# Patient Record
Sex: MALE | Race: OTHER | Employment: FULL TIME | URBAN - METROPOLITAN AREA
[De-identification: names, ages, dates, MRNs, and addresses within clinical notes are randomized per-mention and may not be internally consistent; named-entity substitution may affect disease eponyms.]

---

## 2022-08-24 ENCOUNTER — APPOINTMENT (OUTPATIENT)
Dept: VASCULAR SURGERY | Age: 42
DRG: 134 | End: 2022-08-24
Attending: PHYSICIAN ASSISTANT
Payer: MEDICAID

## 2022-08-24 ENCOUNTER — HOSPITAL ENCOUNTER (INPATIENT)
Age: 42
LOS: 1 days | Discharge: HOME | DRG: 134 | End: 2022-08-25
Attending: STUDENT IN AN ORGANIZED HEALTH CARE EDUCATION/TRAINING PROGRAM | Admitting: INTERNAL MEDICINE
Payer: MEDICAID

## 2022-08-24 ENCOUNTER — APPOINTMENT (OUTPATIENT)
Dept: GENERAL RADIOLOGY | Age: 42
DRG: 134 | End: 2022-08-24
Attending: STUDENT IN AN ORGANIZED HEALTH CARE EDUCATION/TRAINING PROGRAM
Payer: MEDICAID

## 2022-08-24 ENCOUNTER — APPOINTMENT (OUTPATIENT)
Dept: CT IMAGING | Age: 42
DRG: 134 | End: 2022-08-24
Attending: PHYSICIAN ASSISTANT
Payer: MEDICAID

## 2022-08-24 DIAGNOSIS — I26.99 BILATERAL PULMONARY EMBOLISM (HCC): Primary | ICD-10-CM

## 2022-08-24 DIAGNOSIS — I82.412 ACUTE DEEP VEIN THROMBOSIS (DVT) OF FEMORAL VEIN OF LEFT LOWER EXTREMITY (HCC): ICD-10-CM

## 2022-08-24 DIAGNOSIS — E27.8 ADRENAL MASS (HCC): ICD-10-CM

## 2022-08-24 LAB
ALBUMIN SERPL-MCNC: 4.1 G/DL (ref 3.5–5)
ALBUMIN/GLOB SERPL: 0.9 {RATIO} (ref 1.1–2.2)
ALP SERPL-CCNC: 99 U/L (ref 45–117)
ALT SERPL-CCNC: 54 U/L (ref 12–78)
ANION GAP SERPL CALC-SCNC: 8 MMOL/L (ref 5–15)
AST SERPL-CCNC: 34 U/L (ref 15–37)
ATRIAL RATE: 104 BPM
BASOPHILS # BLD: 0 K/UL (ref 0–0.1)
BASOPHILS # BLD: 0.1 K/UL (ref 0–0.1)
BASOPHILS NFR BLD: 1 % (ref 0–1)
BASOPHILS NFR BLD: 1 % (ref 0–1)
BILIRUB SERPL-MCNC: 0.9 MG/DL (ref 0.2–1)
BNP SERPL-MCNC: 243 PG/ML
BUN SERPL-MCNC: 14 MG/DL (ref 6–20)
BUN/CREAT SERPL: 11 (ref 12–20)
CALCIUM SERPL-MCNC: 9.5 MG/DL (ref 8.5–10.1)
CALCULATED P AXIS, ECG09: 42 DEGREES
CALCULATED R AXIS, ECG10: 1 DEGREES
CALCULATED T AXIS, ECG11: 30 DEGREES
CHLORIDE SERPL-SCNC: 101 MMOL/L (ref 97–108)
CO2 SERPL-SCNC: 28 MMOL/L (ref 21–32)
COMMENT, HOLDF: NORMAL
CREAT SERPL-MCNC: 1.24 MG/DL (ref 0.7–1.3)
DIAGNOSIS, 93000: NORMAL
DIFFERENTIAL METHOD BLD: ABNORMAL
DIFFERENTIAL METHOD BLD: ABNORMAL
EOSINOPHIL # BLD: 0.2 K/UL (ref 0–0.4)
EOSINOPHIL # BLD: 0.2 K/UL (ref 0–0.4)
EOSINOPHIL NFR BLD: 3 % (ref 0–7)
EOSINOPHIL NFR BLD: 3 % (ref 0–7)
ERYTHROCYTE [DISTWIDTH] IN BLOOD BY AUTOMATED COUNT: 11.7 % (ref 11.5–14.5)
ERYTHROCYTE [DISTWIDTH] IN BLOOD BY AUTOMATED COUNT: 11.7 % (ref 11.5–14.5)
GLOBULIN SER CALC-MCNC: 4.6 G/DL (ref 2–4)
GLUCOSE SERPL-MCNC: 106 MG/DL (ref 65–100)
HCT VFR BLD AUTO: 48.4 % (ref 36.6–50.3)
HCT VFR BLD AUTO: 52.7 % (ref 36.6–50.3)
HGB BLD-MCNC: 17.4 G/DL (ref 12.1–17)
HGB BLD-MCNC: 18.5 G/DL (ref 12.1–17)
IMM GRANULOCYTES # BLD AUTO: 0 K/UL (ref 0–0.04)
IMM GRANULOCYTES # BLD AUTO: 0.1 K/UL (ref 0–0.04)
IMM GRANULOCYTES NFR BLD AUTO: 1 % (ref 0–0.5)
IMM GRANULOCYTES NFR BLD AUTO: 1 % (ref 0–0.5)
INR PPP: 1 (ref 0.9–1.1)
LYMPHOCYTES # BLD: 1.3 K/UL (ref 0.8–3.5)
LYMPHOCYTES # BLD: 1.6 K/UL (ref 0.8–3.5)
LYMPHOCYTES NFR BLD: 18 % (ref 12–49)
LYMPHOCYTES NFR BLD: 22 % (ref 12–49)
MCH RBC QN AUTO: 33.5 PG (ref 26–34)
MCH RBC QN AUTO: 33.9 PG (ref 26–34)
MCHC RBC AUTO-ENTMCNC: 35.1 G/DL (ref 30–36.5)
MCHC RBC AUTO-ENTMCNC: 36 G/DL (ref 30–36.5)
MCV RBC AUTO: 94.3 FL (ref 80–99)
MCV RBC AUTO: 95.5 FL (ref 80–99)
MONOCYTES # BLD: 0.8 K/UL (ref 0–1)
MONOCYTES # BLD: 1 K/UL (ref 0–1)
MONOCYTES NFR BLD: 11 % (ref 5–13)
MONOCYTES NFR BLD: 13 % (ref 5–13)
NEUTS SEG # BLD: 4.6 K/UL (ref 1.8–8)
NEUTS SEG # BLD: 4.8 K/UL (ref 1.8–8)
NEUTS SEG NFR BLD: 62 % (ref 32–75)
NEUTS SEG NFR BLD: 64 % (ref 32–75)
NRBC # BLD: 0 K/UL (ref 0–0.01)
NRBC # BLD: 0 K/UL (ref 0–0.01)
NRBC BLD-RTO: 0 PER 100 WBC
NRBC BLD-RTO: 0 PER 100 WBC
P-R INTERVAL, ECG05: 118 MS
PLATELET # BLD AUTO: 203 K/UL (ref 150–400)
PLATELET # BLD AUTO: 207 K/UL (ref 150–400)
PMV BLD AUTO: 10.1 FL (ref 8.9–12.9)
PMV BLD AUTO: 9.9 FL (ref 8.9–12.9)
POTASSIUM SERPL-SCNC: 4.1 MMOL/L (ref 3.5–5.1)
PROT SERPL-MCNC: 8.7 G/DL (ref 6.4–8.2)
PROTHROMBIN TIME: 10.4 SEC (ref 9–11.1)
Q-T INTERVAL, ECG07: 350 MS
QRS DURATION, ECG06: 82 MS
QTC CALCULATION (BEZET), ECG08: 460 MS
RBC # BLD AUTO: 5.13 M/UL (ref 4.1–5.7)
RBC # BLD AUTO: 5.52 M/UL (ref 4.1–5.7)
SAMPLES BEING HELD,HOLD: NORMAL
SODIUM SERPL-SCNC: 137 MMOL/L (ref 136–145)
TROPONIN-HIGH SENSITIVITY: 18 NG/L (ref 0–76)
VENTRICULAR RATE, ECG03: 104 BPM
WBC # BLD AUTO: 7.4 K/UL (ref 4.1–11.1)
WBC # BLD AUTO: 7.4 K/UL (ref 4.1–11.1)

## 2022-08-24 PROCEDURE — 94761 N-INVAS EAR/PLS OXIMETRY MLT: CPT

## 2022-08-24 PROCEDURE — 80053 COMPREHEN METABOLIC PANEL: CPT

## 2022-08-24 PROCEDURE — 83880 ASSAY OF NATRIURETIC PEPTIDE: CPT

## 2022-08-24 PROCEDURE — 96360 HYDRATION IV INFUSION INIT: CPT

## 2022-08-24 PROCEDURE — 65270000046 HC RM TELEMETRY

## 2022-08-24 PROCEDURE — 74011000636 HC RX REV CODE- 636: Performed by: RADIOLOGY

## 2022-08-24 PROCEDURE — 74011250636 HC RX REV CODE- 250/636: Performed by: PHYSICIAN ASSISTANT

## 2022-08-24 PROCEDURE — 93971 EXTREMITY STUDY: CPT

## 2022-08-24 PROCEDURE — 84484 ASSAY OF TROPONIN QUANT: CPT

## 2022-08-24 PROCEDURE — 71046 X-RAY EXAM CHEST 2 VIEWS: CPT

## 2022-08-24 PROCEDURE — 93005 ELECTROCARDIOGRAM TRACING: CPT

## 2022-08-24 PROCEDURE — 71275 CT ANGIOGRAPHY CHEST: CPT

## 2022-08-24 PROCEDURE — 85025 COMPLETE CBC W/AUTO DIFF WBC: CPT

## 2022-08-24 PROCEDURE — 99285 EMERGENCY DEPT VISIT HI MDM: CPT

## 2022-08-24 PROCEDURE — 85610 PROTHROMBIN TIME: CPT

## 2022-08-24 RX ORDER — MORPHINE SULFATE 2 MG/ML
2 INJECTION, SOLUTION INTRAMUSCULAR; INTRAVENOUS
Status: DISCONTINUED | OUTPATIENT
Start: 2022-08-24 | End: 2022-08-25 | Stop reason: HOSPADM

## 2022-08-24 RX ORDER — ONDANSETRON 4 MG/1
4 TABLET, ORALLY DISINTEGRATING ORAL
Status: DISCONTINUED | OUTPATIENT
Start: 2022-08-24 | End: 2022-08-25 | Stop reason: HOSPADM

## 2022-08-24 RX ORDER — OXYCODONE AND ACETAMINOPHEN 5; 325 MG/1; MG/1
1 TABLET ORAL
Status: DISCONTINUED | OUTPATIENT
Start: 2022-08-24 | End: 2022-08-25 | Stop reason: HOSPADM

## 2022-08-24 RX ORDER — SODIUM CHLORIDE, SODIUM LACTATE, POTASSIUM CHLORIDE, CALCIUM CHLORIDE 600; 310; 30; 20 MG/100ML; MG/100ML; MG/100ML; MG/100ML
75 INJECTION, SOLUTION INTRAVENOUS CONTINUOUS
Status: DISCONTINUED | OUTPATIENT
Start: 2022-08-24 | End: 2022-08-25

## 2022-08-24 RX ORDER — HEPARIN SODIUM 1000 [USP'U]/ML
80 INJECTION, SOLUTION INTRAVENOUS; SUBCUTANEOUS ONCE
Status: COMPLETED | OUTPATIENT
Start: 2022-08-24 | End: 2022-08-24

## 2022-08-24 RX ORDER — HEPARIN SODIUM 10000 [USP'U]/100ML
16-36 INJECTION, SOLUTION INTRAVENOUS
Status: DISCONTINUED | OUTPATIENT
Start: 2022-08-24 | End: 2022-08-25

## 2022-08-24 RX ORDER — ONDANSETRON 2 MG/ML
4 INJECTION INTRAMUSCULAR; INTRAVENOUS
Status: DISCONTINUED | OUTPATIENT
Start: 2022-08-24 | End: 2022-08-25 | Stop reason: HOSPADM

## 2022-08-24 RX ORDER — EMTRICITABINE AND TENOFOVIR DISOPROXIL FUMARATE 200; 300 MG/1; MG/1
1 TABLET, FILM COATED ORAL DAILY
COMMUNITY

## 2022-08-24 RX ORDER — SODIUM CHLORIDE 0.9 % (FLUSH) 0.9 %
5-40 SYRINGE (ML) INJECTION AS NEEDED
Status: DISCONTINUED | OUTPATIENT
Start: 2022-08-24 | End: 2022-08-25 | Stop reason: HOSPADM

## 2022-08-24 RX ORDER — ACETAMINOPHEN 325 MG/1
650 TABLET ORAL
Status: DISCONTINUED | OUTPATIENT
Start: 2022-08-24 | End: 2022-08-24

## 2022-08-24 RX ORDER — POLYETHYLENE GLYCOL 3350 17 G/17G
17 POWDER, FOR SOLUTION ORAL DAILY PRN
Status: DISCONTINUED | OUTPATIENT
Start: 2022-08-24 | End: 2022-08-25 | Stop reason: HOSPADM

## 2022-08-24 RX ORDER — EMTRICITABINE AND TENOFOVIR DISOPROXIL FUMARATE 200; 300 MG/1; MG/1
1 TABLET, FILM COATED ORAL DAILY
Status: DISCONTINUED | OUTPATIENT
Start: 2022-08-25 | End: 2022-08-25 | Stop reason: HOSPADM

## 2022-08-24 RX ORDER — ACETAMINOPHEN 650 MG/1
650 SUPPOSITORY RECTAL
Status: DISCONTINUED | OUTPATIENT
Start: 2022-08-24 | End: 2022-08-25 | Stop reason: HOSPADM

## 2022-08-24 RX ORDER — SODIUM CHLORIDE 0.9 % (FLUSH) 0.9 %
5-40 SYRINGE (ML) INJECTION EVERY 8 HOURS
Status: DISCONTINUED | OUTPATIENT
Start: 2022-08-24 | End: 2022-08-25 | Stop reason: HOSPADM

## 2022-08-24 RX ORDER — ACETAMINOPHEN 325 MG/1
650 TABLET ORAL
Status: DISCONTINUED | OUTPATIENT
Start: 2022-08-24 | End: 2022-08-25 | Stop reason: HOSPADM

## 2022-08-24 RX ADMIN — IOPAMIDOL 100 ML: 755 INJECTION, SOLUTION INTRAVENOUS at 18:39

## 2022-08-24 RX ADMIN — HEPARIN SODIUM 7180 UNITS: 1000 INJECTION INTRAVENOUS; SUBCUTANEOUS at 21:23

## 2022-08-24 RX ADMIN — HEPARIN SODIUM AND DEXTROSE 16 UNITS/KG/HR: 10000; 5 INJECTION INTRAVENOUS at 21:23

## 2022-08-24 RX ADMIN — SODIUM CHLORIDE 1000 ML: 900 INJECTION, SOLUTION INTRAVENOUS at 17:21

## 2022-08-24 NOTE — ED NOTES
Bedside and Verbal shift change report given to Loren Richardson RN (oncoming nurse) by Remedios Verduzco RN (offgoing nurse). Report included the following information SBAR, ED Summary, MAR, and Recent Results.

## 2022-08-24 NOTE — ED NOTES
Bedside and Verbal shift change report given to Miriam Boswell RN (oncoming nurse) by Deuce Nicolas RN (offgoing nurse). Report included the following information SBAR.

## 2022-08-24 NOTE — ED TRIAGE NOTES
Pt referred from [facility] for SOB and LEFT lower leg swelling. Pt reports he also noticed some discoloration to his penis. Pt referred to rule out DVT/PE.

## 2022-08-24 NOTE — ED PROVIDER NOTES
44yo male with high risk behavior on pre-exposure prophylaxis (PrEP) on Truvada presenting with L calf pain and shortness of breath x 4 days. 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 No hx of PE, DVT. No CP, cough, URI sx's, fever, chills, leg swelling. He states 4 days ago he began to feel calf pain and felt \"anxious\". Feeling winded with walking up his steps at home prompting the ER visit. History reviewed. No pertinent past medical history. History reviewed. No pertinent surgical history. History reviewed. No pertinent family history. Social History     Socioeconomic History    Marital status: SINGLE     Spouse name: Not on file    Number of children: Not on file    Years of education: Not on file    Highest education level: Not on file   Occupational History    Not on file   Tobacco Use    Smoking status: Not on file    Smokeless tobacco: Not on file   Substance and Sexual Activity    Alcohol use: Not on file    Drug use: Not on file    Sexual activity: Not on file   Other Topics Concern    Not on file   Social History Narrative    Not on file     Social Determinants of Health     Financial Resource Strain: Not on file   Food Insecurity: Not on file   Transportation Needs: Not on file   Physical Activity: Not on file   Stress: Not on file   Social Connections: Not on file   Intimate Partner Violence: Not on file   Housing Stability: Not on file         ALLERGIES: Pcn [penicillins]    Review of Systems   Constitutional: Negative. Negative for activity change, chills, fatigue and unexpected weight change. HENT:  Negative for trouble swallowing. Respiratory:  Positive for shortness of breath. Negative for cough, chest tightness and wheezing. Cardiovascular: Negative. Negative for chest pain and palpitations. Gastrointestinal: Negative. Negative for abdominal pain, diarrhea, nausea and vomiting. Genitourinary: Negative.   Negative for dysuria, flank pain, frequency and hematuria. Musculoskeletal: Negative. Negative for arthralgias, back pain, neck pain and neck stiffness. L calf pain   Skin: Negative. Negative for color change and rash. Neurological: Negative. Negative for dizziness, numbness and headaches. All other systems reviewed and are negative. Vitals:    08/24/22 1553   BP: (!) 144/96   Pulse: (!) 113   Resp: 18   Temp: 97.8 °F (36.6 °C)   SpO2: 98%            Physical Exam  Vitals and nursing note reviewed. Constitutional:       General: He is not in acute distress. Appearance: Normal appearance. He is well-developed. He is not toxic-appearing or diaphoretic. HENT:      Head: Normocephalic and atraumatic. Eyes:      General:         Right eye: No discharge. Left eye: No discharge. Conjunctiva/sclera: Conjunctivae normal.   Neck:      Trachea: No tracheal tenderness. Cardiovascular:      Rate and Rhythm: Regular rhythm. Tachycardia present. Pulses: Normal pulses. Heart sounds: Normal heart sounds. No murmur heard. No friction rub. No gallop. Pulmonary:      Effort: Pulmonary effort is normal. No respiratory distress. Breath sounds: Normal breath sounds. No wheezing or rales. Chest:      Chest wall: No tenderness. Abdominal:      General: Bowel sounds are normal. There is no distension. Palpations: Abdomen is soft. Tenderness: There is no abdominal tenderness. There is no guarding or rebound. Musculoskeletal:         General: No tenderness. Normal range of motion. Cervical back: Full passive range of motion without pain and normal range of motion. Skin:     General: Skin is warm and dry. Capillary Refill: Capillary refill takes less than 2 seconds. Findings: No abrasion, erythema or rash. Neurological:      General: No focal deficit present. Mental Status: He is alert and oriented to person, place, and time. Cranial Nerves: No cranial nerve deficit. Sensory: No sensory deficit. Coordination: Coordination normal.   Psychiatric:         Speech: Speech normal.         Behavior: Behavior normal.        MDM  Number of Diagnoses or Management Options  Acute deep vein thrombosis (DVT) of femoral vein of left lower extremity (HCC)  Adrenal mass (HCC)  Bilateral pulmonary embolism (HCC)  Diagnosis management comments:   Ddx: DVT, PE, heart strain, ACS       Amount and/or Complexity of Data Reviewed  Clinical lab tests: ordered and reviewed  Tests in the radiology section of CPT®: ordered and reviewed  Review and summarize past medical records: yes  Discuss the patient with other providers: yes (Hospitalist, ER attending)  Independent visualization of images, tracings, or specimens: yes (EKG)    Patient Progress  Patient progress: stable    ED Course as of 08/26/22 0105   Wed Aug 24, 2022   1610 ED EKG interpretation:4:10 PM  Rhythm: sinus tachycardia; Rate (approx.): 104; Axis: normal; QRS interval: normal ; ST/T wave: normal [JW]      ED Course User Index  [JW] Kory Zheng MD       Procedures    I discussed patient's PMH, exam findings as well as careplan with the ER attending who agrees with care plan. Cabrera Brown PA-C    Total critical care time (not including time spent performing separately reportable procedures): 40    [facility] for Admission  7:20 PM    ED Room Number: ER25/25  Patient Name and age:  Lu Marquez 35 y.o.  male  Working Diagnosis:   1. Bilateral pulmonary embolism ([address]    2. Acute deep vein thrombosis (DVT) of femoral vein of left lower extremity (HCC)    3. Adrenal mass ([address]        COVID-19 Suspicion:  no  Sepsis present:  no  Reassessment needed: N/A  Code Status:  Full Code  Readmission: no  Isolation Requirements:  no  Recommended Level of Care:  telemetry  Department:[facility] Adult ED - 21   Other:  42yo male with SOB and calf pain found to have extensive bilateral PEs and L distal femoral DVT.  No previous history. Sits in the car long hours. Is on PreP therapy, Truvada. Heparin drip ordered.

## 2022-08-25 ENCOUNTER — APPOINTMENT (OUTPATIENT)
Dept: CT IMAGING | Age: 42
DRG: 134 | End: 2022-08-25
Attending: INTERNAL MEDICINE
Payer: MEDICAID

## 2022-08-25 ENCOUNTER — APPOINTMENT (OUTPATIENT)
Dept: NON INVASIVE DIAGNOSTICS | Age: 42
DRG: 134 | End: 2022-08-25
Attending: INTERNAL MEDICINE
Payer: MEDICAID

## 2022-08-25 VITALS
WEIGHT: 197.97 LBS | OXYGEN SATURATION: 97 % | SYSTOLIC BLOOD PRESSURE: 137 MMHG | DIASTOLIC BLOOD PRESSURE: 83 MMHG | HEIGHT: 70 IN | RESPIRATION RATE: 16 BRPM | HEART RATE: 80 BPM | TEMPERATURE: 99.1 F | BODY MASS INDEX: 28.34 KG/M2

## 2022-08-25 LAB
ALBUMIN SERPL-MCNC: 3.4 G/DL (ref 3.5–5)
ALBUMIN/GLOB SERPL: 0.9 {RATIO} (ref 1.1–2.2)
ALP SERPL-CCNC: 85 U/L (ref 45–117)
ALT SERPL-CCNC: 41 U/L (ref 12–78)
ANION GAP SERPL CALC-SCNC: 7 MMOL/L (ref 5–15)
APTT PPP: 55.5 SEC (ref 22.1–31)
APTT PPP: 66.1 SEC (ref 22.1–31)
AST SERPL-CCNC: 25 U/L (ref 15–37)
BASOPHILS # BLD: 0.1 K/UL (ref 0–0.1)
BASOPHILS NFR BLD: 1 % (ref 0–1)
BILIRUB SERPL-MCNC: 0.9 MG/DL (ref 0.2–1)
BUN SERPL-MCNC: 15 MG/DL (ref 6–20)
BUN/CREAT SERPL: 14 (ref 12–20)
CALCIUM SERPL-MCNC: 8.7 MG/DL (ref 8.5–10.1)
CHLORIDE SERPL-SCNC: 104 MMOL/L (ref 97–108)
CO2 SERPL-SCNC: 27 MMOL/L (ref 21–32)
COMMENT, HOLDF: NORMAL
CREAT SERPL-MCNC: 1.06 MG/DL (ref 0.7–1.3)
DIFFERENTIAL METHOD BLD: ABNORMAL
ECHO AO ROOT DIAM: 3.3 CM
ECHO AO ROOT INDEX: 1.59 CM/M2
ECHO AV AREA PEAK VELOCITY: 3.3 CM2
ECHO AV AREA/BSA PEAK VELOCITY: 1.6 CM2/M2
ECHO AV PEAK GRADIENT: 4 MMHG
ECHO AV PEAK VELOCITY: 1.1 M/S
ECHO AV VELOCITY RATIO: 0.82
ECHO LA DIAMETER INDEX: 1.59 CM/M2
ECHO LA DIAMETER: 3.3 CM
ECHO LA TO AORTIC ROOT RATIO: 1
ECHO LV E' LATERAL VELOCITY: 13 CM/S
ECHO LV E' SEPTAL VELOCITY: 10 CM/S
ECHO LV FRACTIONAL SHORTENING: 30 % (ref 28–44)
ECHO LV INTERNAL DIMENSION DIASTOLE INDEX: 1.3 CM/M2
ECHO LV INTERNAL DIMENSION DIASTOLIC: 2.7 CM (ref 4.2–5.9)
ECHO LV INTERNAL DIMENSION SYSTOLIC INDEX: 0.91 CM/M2
ECHO LV INTERNAL DIMENSION SYSTOLIC: 1.9 CM
ECHO LV IVSD: 1 CM (ref 0.6–1)
ECHO LV MASS 2D: 65.2 G (ref 88–224)
ECHO LV MASS INDEX 2D: 31.3 G/M2 (ref 49–115)
ECHO LV POSTERIOR WALL DIASTOLIC: 0.9 CM (ref 0.6–1)
ECHO LV RELATIVE WALL THICKNESS RATIO: 0.67
ECHO LVOT AREA: 4.2 CM2
ECHO LVOT DIAM: 2.3 CM
ECHO LVOT PEAK GRADIENT: 3 MMHG
ECHO LVOT PEAK VELOCITY: 0.9 M/S
ECHO MV E VELOCITY: 0.63 M/S
ECHO MV E/E' LATERAL: 4.85
ECHO MV E/E' RATIO (AVERAGED): 5.57
ECHO MV E/E' SEPTAL: 6.3
ECHO MV REGURGITANT PEAK GRADIENT: 5 MMHG
ECHO MV REGURGITANT PEAK VELOCITY: 1.1 M/S
ECHO PV MAX VELOCITY: 1 M/S
ECHO PV PEAK GRADIENT: 4 MMHG
ECHO RV TAPSE: 2.2 CM (ref 1.7–?)
EOSINOPHIL # BLD: 0.2 K/UL (ref 0–0.4)
EOSINOPHIL NFR BLD: 2 % (ref 0–7)
ERYTHROCYTE [DISTWIDTH] IN BLOOD BY AUTOMATED COUNT: 11.7 % (ref 11.5–14.5)
GLOBULIN SER CALC-MCNC: 4 G/DL (ref 2–4)
GLUCOSE SERPL-MCNC: 108 MG/DL (ref 65–100)
HCT VFR BLD AUTO: 46.8 % (ref 36.6–50.3)
HGB BLD-MCNC: 16.9 G/DL (ref 12.1–17)
IMM GRANULOCYTES # BLD AUTO: 0.1 K/UL (ref 0–0.04)
IMM GRANULOCYTES NFR BLD AUTO: 1 % (ref 0–0.5)
LYMPHOCYTES # BLD: 1.8 K/UL (ref 0.8–3.5)
LYMPHOCYTES NFR BLD: 26 % (ref 12–49)
MAGNESIUM SERPL-MCNC: 2.7 MG/DL (ref 1.6–2.4)
MCH RBC QN AUTO: 33.7 PG (ref 26–34)
MCHC RBC AUTO-ENTMCNC: 36.1 G/DL (ref 30–36.5)
MCV RBC AUTO: 93.2 FL (ref 80–99)
MONOCYTES # BLD: 0.9 K/UL (ref 0–1)
MONOCYTES NFR BLD: 13 % (ref 5–13)
NEUTS SEG # BLD: 4.1 K/UL (ref 1.8–8)
NEUTS SEG NFR BLD: 57 % (ref 32–75)
NRBC # BLD: 0 K/UL (ref 0–0.01)
NRBC BLD-RTO: 0 PER 100 WBC
PHOSPHATE SERPL-MCNC: 3.1 MG/DL (ref 2.6–4.7)
PLATELET # BLD AUTO: 196 K/UL (ref 150–400)
PMV BLD AUTO: 9.8 FL (ref 8.9–12.9)
POTASSIUM SERPL-SCNC: 3.5 MMOL/L (ref 3.5–5.1)
PROT SERPL-MCNC: 7.4 G/DL (ref 6.4–8.2)
RBC # BLD AUTO: 5.02 M/UL (ref 4.1–5.7)
SAMPLES BEING HELD,HOLD: NORMAL
SODIUM SERPL-SCNC: 138 MMOL/L (ref 136–145)
THERAPEUTIC RANGE,PTTT: ABNORMAL SECS (ref 58–77)
THERAPEUTIC RANGE,PTTT: ABNORMAL SECS (ref 58–77)
TROPONIN-HIGH SENSITIVITY: 11 NG/L (ref 0–76)
TSH SERPL DL<=0.05 MIU/L-ACNC: 1.15 UIU/ML (ref 0.36–3.74)
WBC # BLD AUTO: 7.2 K/UL (ref 4.1–11.1)

## 2022-08-25 PROCEDURE — 74011250637 HC RX REV CODE- 250/637: Performed by: HOSPITALIST

## 2022-08-25 PROCEDURE — 83735 ASSAY OF MAGNESIUM: CPT

## 2022-08-25 PROCEDURE — 74176 CT ABD & PELVIS W/O CONTRAST: CPT

## 2022-08-25 PROCEDURE — 74011000250 HC RX REV CODE- 250: Performed by: INTERNAL MEDICINE

## 2022-08-25 PROCEDURE — 36415 COLL VENOUS BLD VENIPUNCTURE: CPT

## 2022-08-25 PROCEDURE — 85730 THROMBOPLASTIN TIME PARTIAL: CPT

## 2022-08-25 PROCEDURE — 74011250637 HC RX REV CODE- 250/637: Performed by: INTERNAL MEDICINE

## 2022-08-25 PROCEDURE — 84484 ASSAY OF TROPONIN QUANT: CPT

## 2022-08-25 PROCEDURE — 84100 ASSAY OF PHOSPHORUS: CPT

## 2022-08-25 PROCEDURE — 84443 ASSAY THYROID STIM HORMONE: CPT

## 2022-08-25 PROCEDURE — 99221 1ST HOSP IP/OBS SF/LOW 40: CPT | Performed by: INTERNAL MEDICINE

## 2022-08-25 PROCEDURE — 85025 COMPLETE CBC W/AUTO DIFF WBC: CPT

## 2022-08-25 PROCEDURE — 80053 COMPREHEN METABOLIC PANEL: CPT

## 2022-08-25 PROCEDURE — 93306 TTE W/DOPPLER COMPLETE: CPT

## 2022-08-25 PROCEDURE — 93306 TTE W/DOPPLER COMPLETE: CPT | Performed by: SPECIALIST

## 2022-08-25 RX ADMIN — EMTRICITABINE AND TENOFOVIR DISOPROXIL FUMARATE 1 TABLET: 200; 300 TABLET, FILM COATED ORAL at 10:02

## 2022-08-25 RX ADMIN — Medication 10 ML: at 14:00

## 2022-08-25 RX ADMIN — APIXABAN 10 MG: 2.5 TABLET, FILM COATED ORAL at 18:01

## 2022-08-25 NOTE — PROGRESS NOTES
Problem: Falls - Risk of  Goal: *Absence of Falls  Description: Document Alejandro Murrieta Fall Risk and appropriate interventions in the flowsheet.   Outcome: Progressing Towards Goal  Note: Fall Risk Interventions:                 Elimination Interventions: Urinal in reach, Call light in reach

## 2022-08-25 NOTE — PROGRESS NOTES
Bedside and Verbal shift change report given to Ania Dial RN (oncoming nurse) by Je Shin RN (offgoing nurse). Report included the following information SBAR, Kardex, Intake/Output, and MAR.

## 2022-08-25 NOTE — H&P
[address]  HISTORY AND PHYSICAL    Name:  Omaira Coker  MR#:  [id number]  :  1980  ACCOUNT #:  [de-identified]  ADMIT DATE:  2022      The patient was seen, evaluated, and admitted by me on 2022. PRIMARY CARE PHYSICIAN:  Unknown. SOURCE OF INFORMATION:  The patient and review of ED electronic medical records. CHIEF COMPLAINT:  Shortness of breath. HISTORY OF PRESENT ILLNESS:  This is a 66-year-old man with a past medical history significant for hypertension, high-risk behavior on pre-exposure prophylaxis for HIV, Truvada, who presented at the emergency room with shortness of breath. This started about 4 days ago and is getting worse, especially with mild exertion such as walking. No associated fever, rigors, or chills and no associated cough. The patient also complained of left calf pain that started at the same time. The pain is constant, dull ache with no radiation. No known aggravating or relieving factors, about 5/10 in severity. 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 No prior history of pulmonary embolism or DVT. When the patient arrived at the emergency room, CTA of the chest was obtained. The CTA shows acute bilateral pulmonary embolism. Ultrasound of the left lower extremity was also positive for DVT. The patient was started on continuous infusion of heparin and was referred to the hospitalist service for admission. No record of prior admission to this hospital.    PAST MEDICAL HISTORY:  Hypertension for which the patient is not taking any medication, high-risk behavior on pre-exposure prophylaxis Truvada for HIV. ALLERGIES:  THE PATIENT IS ALLERGIC TO PENICILLIN. MEDICATIONS:  Truvada 1 tablet daily. FAMILY HISTORY:  This was reviewed. His mother had dyslipidemia. PAST SURGICAL HISTORY:  Not significant.     SOCIAL HISTORY:  No history of alcohol or tobacco abuse. REVIEW OF SYSTEMS:  HEAD, EYES, EARS, NOSE, AND THROAT:  No headache, no dizziness, no blurring of vision, no photophobia. RESPIRATORY SYSTEM:  This is positive for shortness of breath. No cough, no hemoptysis. CARDIOVASCULAR SYSTEM:  No chest pain, no orthopnea, no palpitation. GASTROINTESTINAL SYSTEM:  No nausea or vomiting. No diarrhea. No constipation. GENITOURINARY SYSTEM:  No dysuria, no urgency, and no frequency. All other systems are reviewed and they are negative. PHYSICAL EXAMINATION:  GENERAL APPEARANCE:  The patient appeared ill, in moderate distress. VITAL SIGNS:  On arrival at the emergency room, temperature 97.8, pulse 113, respiratory rate 18, blood pressure 144/96, oxygen saturation 98%. HEENT:  Head:  Normocephalic, atraumatic. Eyes:  Normal eye movement. No redness, no drainage, no discharge. Ears:  Normal external ears with no evidence of drainage. Nose:  No deformity and no drainage. Mouth and Throat:  No visible oral lesion. NECK:  Neck is supple. No JVD, no thyromegaly. CHEST:  Clear breath sounds. No wheezing, no crackles. HEART:  Normal S1 and S2, regular. No clinically appreciable murmur. ABDOMEN:  Soft, nontender. Normal bowel sounds. CNS:  Alert, oriented x3. No gross focal neurological deficit. EXTREMITIES:  No edema. Pulses 2+ bilaterally. MUSCULOSKELETAL SYSTEM:  Mild calf tenderness. SKIN:  No active skin lesions seen in the exposed part of the body. PSYCHIATRY:  Normal mood and affect. LYMPHATIC SYSTEM:  No cervical lymphadenopathy. DIAGNOSTIC DATA:  EKG shows sinus tachycardia. No significant ST and T-waves abnormalities. Chest x-ray, no acute process. Ultrasound of the left lower extremity, positive for DVT.   CTA of the chest shows extensive pulmonary embolus bilaterally involving all lobes, left adrenal mass which may represent an adenoma or cyst.    LABORATORY DATA:  Hematology:  WBC 7.4, hemoglobin 18.5, hematocrit 52.7, platelets 739. Chemistry:  Sodium 137, potassium 4.1, chloride 101, CO2 28, glucose 106, BUN 14, creatinine 1.24, calcium 9.5, total bilirubin 0.9, ALT 54, AST 34, alkaline phosphatase 99, total protein 8.7, albumin level 4.1, globulin at 4.5. Pro-BNP level 243. Troponin high-sensitivity 18. ASSESSMENT:  1. Acute bilateral pulmonary embolism. 2.  Acute deep vein thrombosis, left lower extremity. 3.  Adrenal mass. 4.  Elevated BNP level. 5.  Erythrocytosis. 6.  Hypertension. 7.  High-risk behavior. PLAN:  1. Acute bilateral pulmonary embolism. We will admit the patient for further evaluation and treatment. We will continue with heparin infusion started in the emergency room. The patient will be a candidate for any of the NOAC at the time of discharge home. The pulmonary embolism is most likely coming from the DVT of the lower extremity. We will obtain CT scan of the abdomen and pelvis to evaluate the patient for mass lesion. We will check serial cardiac markers. Since this is quite extensive, Hematology consult will be requested to assist in further evaluation and treatment. 2.  Acute DVT, left lower extremity. We will continue with heparin infusion as stated above. This is the most likely source of the patient's acute bilateral pulmonary embolism. 3.  Adrenal mass. This is most likely incidental finding, but we will obtain a CT scan of the abdomen and pelvis for further evaluation. The patient may require further evaluation depending on the result of the CT scan of the abdomen and pelvis. 4.  Elevated BNP level. The chest x-ray did not show vascular congestion. We will await the results of echocardiogram to determine whether the elevated BNP level is cardiac in origin. 5.  Erythrocytosis. This is most likely due to the volume depletion. We will carry out fluid therapy and monitor the patient's lab. We will check C-reactive protein level. We will also check urine drug screen.   6. Hypertension. The patient is not on any medication for this. We will continue to monitor. If average blood pressure reading remains elevated, the patient will require antihypertensive medication at the time of discharge home. We will check a TSH level. 7.  High-risk behavior. We will continue with pre-exposure prophylaxis Truvada for HIV infection. 8.  Other Issues:  Code Status: The patient is a full code. The patient is on heparin infusion, because of that, there is no need for DVT prophylaxis. FUNCTIONAL STATUS PRIOR TO ADMISSION:  The patient came from home. The patient is ambulatory with no assistive device. COVID PRECAUTION:  The patient was wearing a face mask. I was wearing a face mask and gloves for this patient's encounter.       Laura Juarez MD      [id number]  D:  08/25/2022 3:53  T:  08/25/2022 5:22  JOB #:  [id number]

## 2022-08-25 NOTE — PROGRESS NOTES
[address] Adult  Hospitalist Group                                                                                          Hospitalist Progress Note  Natalia Crespo MD  Answering service: [phone] from in house phone        Date of Service:  2022  NAME:  Antonio Barahona  :  1980  MRN:  [id number]      Admission Summary: This is a 42-year-old man with a past medical history significant for hypertension, high-risk behavior on pre-exposure prophylaxis for HIV, Truvada, who presented at the emergency room with shortness of breath. This started about 4 days ago and is getting worse, especially with mild exertion such as walking. No associated fever, rigors, or chills and no associated cough. The patient also complained of left calf pain that started at the same time. The pain is constant, dull ache with no radiation. No known aggravating or relieving factors, about 5/10 in severity. 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 No prior history of pulmonary embolism or DVT. When the patient arrived at the emergency room, CTA of the chest was obtained. The CTA shows acute bilateral pulmonary embolism. Ultrasound of the left lower extremity was also positive for DVT. The patient was started on continuous infusion of heparin and was referred to the hospitalist service for admission. No record of prior admission to this hospital.       Interval history / Subjective:    Follow Acute DVT/PE   Wants to go home today  Assessment & Plan:     Acute PE/DVT  -no saddle embolus  -on heparin gtt  -Echo   -Appreciate Hematology, switch to NOAC at discharge    Adrenal mass: follow CT abd    Regular diet    Wants to go home today but promises to see a PMD to get the results of echo and CT abd.he understands the risks       Code status: FULL CODE  Prophylaxis: heparin gtt    Plan: heparin gtt, if echo unremarkable will discharge the patient  Care Plan discussed with: Patient  Anticipated Disposition: home     Hospital Problems  Date Reviewed: 8/24/2022            Codes Class Noted POA    * (Principal) Bilateral pulmonary embolism ([facility] [address] ICD-10-CM: I26.99  ICD-9-CM: 415.19  8/24/2022 Yes             Review of Systems:   A comprehensive review of systems was negative except for that written in the HPI. Vital Signs:    Last 24hrs VS reviewed since prior progress note. Most recent are:  Visit Vitals  /81   Pulse (!) 103   Temp 98.9 °F (37.2 °C)   Resp 18   Ht 5' 10\" (1.778 m)   Wt 89.8 kg (197 lb 15.6 oz)   SpO2 96%   BMI 28.41 kg/m²         Intake/Output Summary (Last 24 hours) at 8/25/2022 1443  Last data filed at 8/24/2022 1821  Gross per 24 hour   Intake 1000 ml   Output --   Net 1000 ml        Physical Examination:     I had a face to face encounter with this patient and independently examined them on 8/25/2022 as outlined below:          Constitutional:  No acute distress, cooperative, pleasant    ENT:  Oral mucosa moist, oropharynx benign. Resp:  CTA bilaterally. No wheezing/rhonchi/rales. No accessory muscle use. CV:  Regular rhythm, normal rate, no murmurs, gallops, rubs    GI:  Soft, non distended, non tender. normoactive bowel sounds, no hepatosplenomegaly     Musculoskeletal:  No edema, warm, 2+ pulses throughout    Neurologic:  Moves all extremities.   AAOx3, CN II-XII reviewed            Data Review:    Review and/or order of clinical lab test      Labs:     Recent Labs     08/25/22  0315 08/24/22  1930   WBC 7.2 7.4   HGB 16.9 17.4*   HCT 46.8 48.4    207     Recent Labs     08/25/22  0315 08/24/22  1607    137   K 3.5 4.1    101   CO2 27 28   BUN 15 14   CREA 1.06 1.24   * 106*   CA 8.7 9.5   MG 2.7*  --    PHOS 3.1  --      Recent Labs     08/25/22  0315 08/24/22  1607   ALT 41 54   AP 85 99   TBILI 0.9 0.9   TP 7.4 8.7*   ALB 3.4* 4.1   GLOB 4.0 4.6*     Recent Labs 08/25/22  1354 08/25/22  0617 08/24/22  1930   INR  --   --  1.0   PTP  --   --  10.4   APTT 55.5* 66.1*  --       No results for input(s): FE, TIBC, PSAT, FERR in the last 72 hours. No results found for: FOL, RBCF   No results for input(s): PH, PCO2, PO2 in the last 72 hours. No results for input(s): CPK, CKNDX, TROIQ in the last 72 hours.     No lab exists for component: CPKMB  No results found for: CHOL, CHOLX, CHLST, CHOLV, HDL, HDLP, LDL, LDLC, DLDLP, TGLX, TRIGL, TRIGP, CHHD, CHHDX  No results found for: GLUCPOC  No results found for: COLOR, APPRN, SPGRU, REFSG, JAYSON, PROTU, GLUCU, KETU, BILU, UROU, TERESITA, LEUKU, GLUKE, EPSU, BACTU, WBCU, RBCU, CASTS, UCRY      Medications Reviewed:     Current Facility-Administered Medications   Medication Dose Route Frequency    heparin 25,000 units in D5W 250 ml infusion  16-36 Units/kg/hr IntraVENous TITRATE    morphine injection 2 mg  2 mg IntraVENous Q4H PRN    oxyCODONE-acetaminophen (PERCOCET) 5-325 mg per tablet 1 Tablet  1 Tablet Oral Q4H PRN    lactated Ringers infusion  75 mL/hr IntraVENous CONTINUOUS    emtricitabine-tenofovir (TDF) (TRUVADA) 200-300 mg per tablet 1 Tablet  1 Tablet Oral DAILY    sodium chloride (NS) flush 5-40 mL  5-40 mL IntraVENous Q8H    sodium chloride (NS) flush 5-40 mL  5-40 mL IntraVENous PRN    acetaminophen (TYLENOL) tablet 650 mg  650 mg Oral Q6H PRN    Or    acetaminophen (TYLENOL) suppository 650 mg  650 mg Rectal Q6H PRN    polyethylene glycol (MIRALAX) packet 17 g  17 g Oral DAILY PRN    ondansetron (ZOFRAN ODT) tablet 4 mg  4 mg Oral Q8H PRN    Or    ondansetron (ZOFRAN) injection 4 mg  4 mg IntraVENous Q6H PRN     ______________________________________________________________________  EXPECTED LENGTH OF STAY: - - -  ACTUAL LENGTH OF STAY:          1                 Theodora Small MD

## 2022-08-25 NOTE — DISCHARGE SUMMARY
Discharge Summary       PATIENT ID: Robbie Soto  MRN: [id number]   YOB: 1980    DATE OF ADMISSION: 8/24/2022  4:49 PM    DATE OF DISCHARGE: 8/25/2022   PRIMARY CARE PROVIDER: None     ATTENDING PHYSICIAN: Dr Rubin Mancuso  DISCHARGING PROVIDER: Rubin Mancuso MD    To contact this individual call [phone] and ask the  to page. If unavailable ask to be transferred the Adult Hospitalist Department. CONSULTATIONS: IP CONSULT TO HEMATOLOGY    PROCEDURES/SURGERIES: * No surgery found *    DISCHARGE DIAGNOSES:   Acute PE/DVT  -no saddle embolus  -on heparin gtt  -Echo   -Appreciate Hematology, switch to NOAC at discharge    Adrenal mass: follow CT abd    Regular diet    Wants to go home today but promises to see a PMD to get the results of echo and CT abd.he understands the risks      ADDITIONAL CARE RECOMMENDATIONS:   Follow up with PMD  Follow up with Hematology     NOTIFY YOUR PHYSICIAN FOR ANY OF THE FOLLOWING:   Fever over 101 degrees for 24 hours. Chest pain, shortness of breath, fever, chills, nausea, vomiting, diarrhea, change in mentation, falling, weakness, bleeding. Severe pain or pain not relieved by medications, as well as any other signs or symptoms that you may have questions about. FOLLOW UP APPOINTMENTS:    Follow-up Information       Follow up With Specialties Details Why Contact Info    pmd  Follow up in 1 week(s)      Gissel Roland MD Hematology and Oncology, Internal Medicine Physician, Hematology Physician, Oncology Follow up in 1 month(s)  [address] [facility]  [phone]                 DIET: Cardiac Diet    ACTIVITY: Activity as tolerated    DISCHARGE MEDICATIONS:  Current Discharge Medication List        START taking these medications    Details   apixaban (ELIQUIS) 5 mg tablet Take 2 Tablets by mouth two (2) times a day.   Qty: 70 Tablet, Refills: 2  Start date: 8/25/2022           CONTINUE these medications which have NOT CHANGED Details   emtricitabine-tenofovir, TDF, (TRUVADA) 200-300 mg per tablet Take 1 Tablet by mouth daily.              DISPOSITION:  x  Home With:   OT  PT  HH  RN       Long term SNF/Inpatient Rehab    Independent/assisted living    Hospice    Other:       PATIENT CONDITION AT DISCHARGE:     Functional status    Poor     Deconditioned    x Independent      Cognition   x  Lucid     Forgetful     Dementia      Catheters/lines (plus indication)    [place]     PICC     PEG    x None      Code status   x  Full code     DNR      PHYSICAL EXAMINATION AT DISCHARGE:  Please see progress note      CHRONIC MEDICAL DIAGNOSES:  Problem List as of 8/25/2022 Date Reviewed: 8/24/2022            Codes Class Noted - Resolved    * (Principal) Bilateral pulmonary embolism ([facility] [address] ICD-10-CM: I26.99  ICD-9-CM: 415.19  8/24/2022 - Present           Greater than 38 minutes were spent with the patient on counseling and coordination of care    Signed:   Gema Wright MD  8/25/2022  3:12 PM    .

## 2022-08-25 NOTE — PROGRESS NOTES
[facility] [place] at [address], [id number] [place] [address] Road, [address] [place] [address] Natalia Espino Deutscher: [phone]  F: [phone]    Reason for Evaluation   Kym Daily is a 35 y.o. male who is seen as a new patient for evaluation of DVT/PE    Treatment History:   8/24/22: heparin gtt initiated upon diagnosis of DVT and PE    History of Present Illness:   Mr. Mike Carrasco is a 35year old male who presented to ED on 8/24/22 after noting new onset increased shortness of breath and left lower extremity swelling. Symptoms started on Sunday 8/21/22. CTA performed revealed extensive pulmonary embolus involving all lobes with without saddle embolus across the main pulmonary artery outflow tract. No evidence of right heart strain. Bilateral venous duplex 8/24/22 consistent with acute LLE DVT. He was initiated on heparin gtt. He remains short of breath today at rest.    He states overall in good health. 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 Denies any prolonged travel, hospitalizations, or surgeries in the past three months. Denies any personal or family history of blood clots. Denies any recent unintentional weight loss, change in appetite or bowel habits. No new pain. History reviewed. No pertinent past medical history. History reviewed. No pertinent surgical history. Social History     Tobacco Use    Smoking status: Not on file    Smokeless tobacco: Not on file   Substance Use Topics    Alcohol use: Not on file      History reviewed. No pertinent family history.   Current Facility-Administered Medications   Medication Dose Route Frequency    heparin 25,000 units in D5W 250 ml infusion  16-36 Units/kg/hr IntraVENous TITRATE    morphine injection 2 mg  2 mg IntraVENous Q4H PRN    oxyCODONE-acetaminophen (PERCOCET) 5-325 mg per tablet 1 Tablet  1 Tablet Oral Q4H PRN    lactated Ringers infusion  75 mL/hr IntraVENous CONTINUOUS    emtricitabine-tenofovir (TDF) (TRUVADA) 200-300 mg per tablet 1 Tablet  1 Tablet Oral DAILY    sodium chloride (NS) flush 5-40 mL  5-40 mL IntraVENous Q8H    sodium chloride (NS) flush 5-40 mL  5-40 mL IntraVENous PRN    acetaminophen (TYLENOL) tablet 650 mg  650 mg Oral Q6H PRN    Or    acetaminophen (TYLENOL) suppository 650 mg  650 mg Rectal Q6H PRN    polyethylene glycol (MIRALAX) packet 17 g  17 g Oral DAILY PRN    ondansetron (ZOFRAN ODT) tablet 4 mg  4 mg Oral Q8H PRN    Or    ondansetron (ZOFRAN) injection 4 mg  4 mg IntraVENous Q6H PRN      Allergies   Allergen Reactions    Pcn [Penicillins] Hives        Review of Systems: A complete review of systems was obtained, negative except as described above. Physical Exam:   Visit Vitals  /81   Pulse (!) 103   Temp 98.9 °F (37.2 °C)   Resp 18   Ht 5' 10\" (1.778 m)   Wt 197 lb 15.6 oz (89.8 kg)   SpO2 96%   BMI 28.41 kg/m²       General: No distress  Eyes: anicteric sclerae  HENT: Atraumatic, OP clear  Neck: Supple  Respiratory: normal respiratory effort at rest  CV: tachycardia  GI: deferred  Skin: No visible rashes, ecchymoses, or petechiae   Psych: Alert, oriented, appropriate affect, normal judgment/insight    Results:     Lab Results   Component Value Date/Time    WBC 7.2 08/25/2022 03:15 AM    HGB 16.9 08/25/2022 03:15 AM    HCT 46.8 08/25/2022 03:15 AM    PLATELET 346 41/55/9640 03:15 AM    MCV 93.2 08/25/2022 03:15 AM    ABS. NEUTROPHILS 4.1 08/25/2022 03:15 AM     Lab Results   Component Value Date/Time    Sodium 138 08/25/2022 03:15 AM    Potassium 3.5 08/25/2022 03:15 AM    Chloride 104 08/25/2022 03:15 AM    CO2 27 08/25/2022 03:15 AM    Glucose 108 (H) 08/25/2022 03:15 AM    BUN 15 08/25/2022 03:15 AM    Creatinine 1.06 08/25/2022 03:15 AM    GFR est AA >60 08/25/2022 03:15 AM    GFR est non-AA >60 08/25/2022 03:15 AM    Calcium 8.7 08/25/2022 03:15 AM     Lab Results   Component Value Date/Time    Bilirubin, total 0.9 08/25/2022 03:15 AM    ALT (SGPT) 41 08/25/2022 03:15 AM    Alk.  phosphatase 85 08/25/2022 03:15 AM    Protein, total 7.4 08/25/2022 03:15 AM    Albumin 3.4 (L) 08/25/2022 03:15 AM    Globulin 4.0 08/25/2022 03:15 AM       8/24/22 CTA  IMPRESSION  1. Extensive pulmonary embolus bilaterally involving all lobes, but without a  saddle embolus across the main pulmonary outflow tract. 2. Upper normal caliber of the main pulmonary outflow tract, but no other CT  evidence for right ventricular strain. 3. Left adrenal mass which may represent an adenoma or cyst. However, strict  criteria are not met on this contrast-enhanced exam. Comparison with any prior  imaging and clinical history is recommended. Further evaluation would require  elective adrenal protocol CT imaging. 8/24/22 Venous Duplex  There is evidence of thrombosis involving the left distal femoral and proximal popliteal veins. The popliteal becomes patent distally. There is also evidence of thrombosis involving the paired left gastroc veins. The ultrasound appearance of the thrombus is more consistent with an acute process. Records reviewed and summarized above. Pathology report(s) reviewed above. Radiology report(s) reviewed above. Assessment:   1) Unprovoked PE/DVT  8/24/22 CTA and dopplers reviewed; extensive PE with no evidence of right heart strain  Heparin gtt infusing; can transition to DOAC upon discharge  Recommend lifelong AC as long as benefit continues to outweigh risk of bleeding  No additional lab testing such as hypercoaguable work up needed as will not    Age appropriate Ca screening     2) Left adrenal mass  Noted in CTA yesterday; CT A/P for further evaluation pending        Plan:     Continue heparin gtt per primary team; can transition to [address] at discharge  He will need to remain on lifelong anticoagulation  Await CT abdomen    I appreciate the opportunity to participate in Mr. Amador [facility].     I personally saw and evaluated the patient and performed the key components of medical decision making. The history, physical exam, and documentation were performed by Fred Vasquez NP. I reviewed and verified the above documentation and modified it as needed.     In addition no adenopathy, no abdominal pain and no B symptoms     Signed By: Manoj Medrano MD

## 2022-08-25 NOTE — ROUTINE PROCESS
TRANSFER - OUT REPORT:    Verbal report given to Soham Eller RN(name) on Lu Marquez  being transferred to [facility](unit) for routine progression of care       Report consisted of patients Situation, Background, Assessment and   Recommendations(SBAR). Information from the following report(s) SBAR, ED Summary, Intake/Output, and MAR was reviewed with the receiving nurse. Lines:   Peripheral IV 08/24/22 Left Forearm (Active)   Site Assessment Clean, dry, & intact 08/24/22 1607   Phlebitis Assessment 0 08/24/22 1607   Infiltration Assessment 0 08/24/22 1607   Dressing Status Clean, dry, & intact 08/24/22 1607   Dressing Type Transparent 08/24/22 1607       Peripheral IV 08/24/22 Right Antecubital (Active)   Site Assessment Clean, dry, & intact 08/24/22 1759   Phlebitis Assessment 0 08/24/22 1759   Infiltration Assessment 0 08/24/22 1759   Dressing Status Clean, dry, & intact 08/24/22 1759   Dressing Type Transparent 08/24/22 1759   Alcohol Cap Used No 08/24/22 1759        Opportunity for questions and clarification was provided.       Patient transported with:   Monitor  Registered Nurse

## 2022-08-25 NOTE — DISCHARGE INSTRUCTIONS
Discharge Instructions       PATIENT ID: Elia Peña  MRN: [id number]   YOB: 1980    DATE OF ADMISSION: [unfilled]    DATE OF DISCHARGE: 8/25/2022    PRIMARY CARE PROVIDER: @PCP@     ATTENDING PHYSICIAN: [unfilled]  DISCHARGING PROVIDER: Margarita Early MD    To contact this individual call [phone] and ask the  to page. If unavailable ask to be transferred the Adult Hospitalist Department. DISCHARGE DIAGNOSES Acute PE/DVT    CONSULTATIONS: Hematology    PROCEDURES/SURGERIES: * No surgery found *    PENDING TEST RESULTS:   At the time of discharge the following test results are still pending: none    FOLLOW UP APPOINTMENTS:   PMD  Hematologist    ADDITIONAL CARE RECOMMENDATIONS: none    DIET: Cardiac Diet    ACTIVITY: Activity as tolerated      DISCHARGE MEDICATIONS:   See Medication Reconciliation Form    It is important that you take the medication exactly as they are prescribed. Keep your medication in the bottles provided by the pharmacist and keep a list of the medication names, dosages, and times to be taken in your wallet. Do not take other medications without consulting your doctor. NOTIFY YOUR PHYSICIAN FOR ANY OF THE FOLLOWING:   Fever over 101 degrees for 24 hours. Chest pain, shortness of breath, fever, chills, nausea, vomiting, diarrhea, change in mentation, falling, weakness, bleeding. Severe pain or pain not relieved by medications. Or, any other signs or symptoms that you may have questions about.       DISPOSITION:  x  Home With:   OT  PT  HH  RN       SNF/Inpatient Rehab/LTAC    Independent/assisted living    Hospice    Other:     CDMP Checked:   Yes x     PROBLEM LIST Updated:  Yes x       Signed:   Margarita Early MD  8/25/2022  3:11 PM

## 2022-10-14 ENCOUNTER — OFFICE VISIT (OUTPATIENT)
Dept: ONCOLOGY | Age: 42
End: 2022-10-14
Payer: MEDICAID

## 2022-10-14 VITALS
SYSTOLIC BLOOD PRESSURE: 163 MMHG | HEART RATE: 85 BPM | HEIGHT: 70 IN | DIASTOLIC BLOOD PRESSURE: 106 MMHG | BODY MASS INDEX: 28.92 KG/M2 | OXYGEN SATURATION: 94 % | RESPIRATION RATE: 18 BRPM | WEIGHT: 202 LBS

## 2022-10-14 DIAGNOSIS — I26.99 BILATERAL PULMONARY EMBOLISM (HCC): Primary | ICD-10-CM

## 2022-10-14 DIAGNOSIS — E27.8 ADRENAL MASS (HCC): ICD-10-CM

## 2022-10-14 PROCEDURE — 99243 OFF/OP CNSLTJ NEW/EST LOW 30: CPT | Performed by: INTERNAL MEDICINE

## 2022-10-14 NOTE — PROGRESS NOTES
Manuel Herron is a 38 y.o. male  Chief Complaint   Patient presents with    New Patient     PE     1. Have you been to the ER, urgent care clinic since your last visit? Hospitalized since your last visit? No    2. Have you seen or consulted any other health care providers outside of the [address] [place] [address] since your last visit? Include any pap smears or colon screening.  No

## 2022-10-14 NOTE — PROGRESS NOTES
[facility] [place] at [address], [id number] [place] [address] Road, [place]: [phone]  F: [phone]    Reason for Visit:   Mehul Nava is a 38 y.o. male who is seen in consultation at the request of Dr. Emilee Rose for evaluation of Pulmonary embolism. History of Present Illness:   Is a 47-year-old male with no significant past medical history seen for above. He presented to the emergency room on 8/25/2022 with complaints of 4 days of shortness of breath getting progressively worse with exertion without any fevers cough chills. Also had left cough pain that started about the same time. Dopplers were obtained and showed thrombosis in the left distal femoral and proximal popliteal veins. CTA showed extensive pulmonary embolism bilaterally. There was a left adrenal mass noted on the CTA. The abdomen was obtained which showed calcifications in the left adrenal gland and thought to be a complex cyst versus cystic mass. He was started on apixaban and comes for a follow-up labs showed a normal CBC and an unremarkable CMP. Sob is improved and leg swelling has resolved. He stays active. He is on Truvada. BP is high - seeing his PCP. No bleeding. He is compliant with eliquis. He was taking 10 BID up until now. 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 Current Outpatient Medications   Medication Sig    apixaban (ELIQUIS) 5 mg tablet Take 2 Tablets by mouth two (2) times a day. emtricitabine-tenofovir, TDF, (TRUVADA) 200-300 mg per tablet Take 1 Tablet by mouth daily. No current facility-administered medications for this visit. Allergies   Allergen Reactions    Pcn [Penicillins] Hives        Review of Systems: A complete review of systems was obtained, negative except as described above.     Physical Exam:   Visit Vitals  BP (!) 163/106   Pulse 85   Resp 18   Ht 5' 10\" (1.778 m)   Wt 202 lb (91.6 kg)   SpO2 94% BMI 28.98 kg/m²     ECOG PS: 0  General: No distress  Eyes: PERRLA, anicteric sclerae  HENT: Atraumatic  Neck: Supple  Respiratory: normal respiratory effort  CV: Normal rate, no peripheral edema  MS: Normal gait and station. Digits without clubbing or cyanosis. Skin: No rashes, ecchymoses, or petechiae. Normal temperature, turgor, and texture. Psych: Alert, oriented, appropriate affect, normal judgment/insight    Results:     Lab Results   Component Value Date/Time    WBC 7.2 08/25/2022 03:15 AM    HGB 16.9 08/25/2022 03:15 AM    HCT 46.8 08/25/2022 03:15 AM    PLATELET 466 64/61/2245 03:15 AM    MCV 93.2 08/25/2022 03:15 AM    ABS. NEUTROPHILS 4.1 08/25/2022 03:15 AM     Lab Results   Component Value Date/Time    Sodium 138 08/25/2022 03:15 AM    Potassium 3.5 08/25/2022 03:15 AM    Chloride 104 08/25/2022 03:15 AM    CO2 27 08/25/2022 03:15 AM    Glucose 108 (H) 08/25/2022 03:15 AM    BUN 15 08/25/2022 03:15 AM    Creatinine 1.06 08/25/2022 03:15 AM    GFR est AA >60 08/25/2022 03:15 AM    GFR est non-AA >60 08/25/2022 03:15 AM    Calcium 8.7 08/25/2022 03:15 AM     Lab Results   Component Value Date/Time    Bilirubin, total 0.9 08/25/2022 03:15 AM    ALT (SGPT) 41 08/25/2022 03:15 AM    Alk. phosphatase 85 08/25/2022 03:15 AM    Protein, total 7.4 08/25/2022 03:15 AM    Albumin 3.4 (L) 08/25/2022 03:15 AM    Globulin 4.0 08/25/2022 03:15 AM       Lab Results   Component Value Date/Time    TSH 1.15 08/25/2022 03:15 AM       Lab Results   Component Value Date/Time    INR 1.0 08/24/2022 07:30 PM    aPTT 55.5 (H) 08/25/2022 01:54 PM       Records reviewed and summarized above. Pathology report(s) reviewed above. Radiology report(s) reviewed above.   CT and dopplers reviewed     Assessment:   1) Pulmonary embolism and Left DVT     8/24/22 CTA and dopplers reviewed; extensive PE with no evidence of right heart strain  Heparin gtt infusing; can transition to [address] upon discharge  Recommend lifelong AC as long as benefit continues to outweigh risk of bleeding  No additional lab testing such as hypercoaguable work up needed as will not    Age appropriate Ca screening     2) Left adrenal mass   CT adrenal protocol  Call with results    3) HTN  Follow up with PCP      Plan:     Continue indefinite Eliquis 5 mg BID  CT adrenal protocol  Call with results  Follow up in 6 months    I appreciate the opportunity to participate in Mr. Cassia monzon.     Signed By: Jeral Lesches, MD

## 2022-10-24 ENCOUNTER — OFFICE VISIT (OUTPATIENT)
Dept: FAMILY MEDICINE CLINIC | Age: 42
End: 2022-10-24
Payer: MEDICAID

## 2022-10-24 VITALS
WEIGHT: 198 LBS | TEMPERATURE: 98.4 F | OXYGEN SATURATION: 98 % | DIASTOLIC BLOOD PRESSURE: 93 MMHG | RESPIRATION RATE: 17 BRPM | BODY MASS INDEX: 28.35 KG/M2 | HEART RATE: 77 BPM | SYSTOLIC BLOOD PRESSURE: 141 MMHG | HEIGHT: 70 IN

## 2022-10-24 DIAGNOSIS — I10 PRIMARY HYPERTENSION: Primary | ICD-10-CM

## 2022-10-24 DIAGNOSIS — E27.8 ADRENAL MASS (HCC): ICD-10-CM

## 2022-10-24 DIAGNOSIS — I26.99 BILATERAL PULMONARY EMBOLISM (HCC): ICD-10-CM

## 2022-10-24 DIAGNOSIS — E78.2 MIXED HYPERLIPIDEMIA: ICD-10-CM

## 2022-10-24 LAB
CHOLEST SERPL-MCNC: 294 MG/DL
HDLC SERPL-MCNC: 36 MG/DL
HDLC SERPL: 8.2 {RATIO} (ref 0–5)
LDLC SERPL CALC-MCNC: 223.2 MG/DL (ref 0–100)
TRIGL SERPL-MCNC: 174 MG/DL (ref ?–150)
VLDLC SERPL CALC-MCNC: 34.8 MG/DL

## 2022-10-24 PROCEDURE — 99203 OFFICE O/P NEW LOW 30 MIN: CPT | Performed by: STUDENT IN AN ORGANIZED HEALTH CARE EDUCATION/TRAINING PROGRAM

## 2022-10-24 NOTE — PATIENT INSTRUCTIONS
YOUR BLOOD PRESSURE IS HIGH:      - GOAL IS FOR YOUR BLOOD PRESSURE TO BE BELOW 140/90    - MAKE SURE TO LIMIT SALT IN THE DIET (LESS THAN 2400 mg/day)    - YOU NEED AT LEAST 6 HOURS OF UNINTERRUPTED SLEEP    - MAKE SURE TO MONITOR YOUR DIET TO AVOID EXCESSIVE CARBS , CHOLESTEROL AND FATS. EAT MORE FRUITS AND VEGETABLES    - LIMIT ALCOHOL INTAKE.    - ADVISE TO QUIT SMOKING     - AVOID EXCESSIVE MEDICATIONS SUCH AS PSEUDOEPHEDRINE, IBUPROFEN OR ALEVE    - CONSIDER WEIGHT LOSS     - ROUTINE DAILY EXERCISE: AT LEAST 30 MINUTES WALK, ABOUT 5 TIMES PER WEEK.     - WILL RULE OUT FOR SLEEP APNEA

## 2022-10-24 NOTE — PROGRESS NOTES
Name and  Verified. Pharmacy verified    Previous PCP: No Previous PCP    Chief Complaint   Patient presents with    New Patient    Establish Care     Patient is not fasting, [facility]. 1. Have you been to the ER, urgent care clinic since your last visit? Hospitalized since your last visit? Yes  2022  [facility]  Bilateral pulmonary embolism    2. Have you seen or consulted any other health care providers outside of the [address] [place] [address] since your last visit? Include any pap smears or colon screening.      Yes  [facility]  2022  HIV Treatment      Health Maintenance Due   Topic Date Due    Hepatitis C Screening  Never done    COVID-19 Vaccine (1) Never done    Hepatitis B Vaccine (1 of 3 - Risk 3-dose series) Never done    DTaP/Tdap/Td series (1 - Tdap) Never done    Lipid Screen  Never done    Flu Vaccine (1) Never done

## 2022-10-24 NOTE — PROGRESS NOTES
[address]  [address] [facility], [address]  [phone]    C/C:DVT/PE and HLD    HPI:    Everton Matos is a 38 y.o. /  male who presents to clinic today for evaluation of the issues listed above. Pt is new to the practice. Previous pcp: None      Subjective; Patient presenting for initial office visit with me today. Significant PMHx include: DVT/PE , Adrenal mass, HTN and HLD    Pt presented to [facility] on 8/24[facility]22 with SOB and later found to have LLE DVT on doppler and CTA showed pulmonary embolism bilaterally. Also found to have a left adrenal mass on CT abd/pelv without contrast (8/25/2022). Currently on eliquis 5 mg BID, managed by heme/onc (Dr. Sophia De León). Sob improved and leg swelling has resolved. Stays active and on Truvada for Pre-exposure prophylaxis. Of note, pt has h/o HLD for which he was on statin in the past but stopped taking. Also has HTN but not interested in meds at this time. Pt denies any  fever, chill, chest pain, abdominal pain, n/v/d, HA or dizziness. 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 On Pre-exposure ppx     Other Specialists/providers:  Heme/onc - Dr. Colette Serra- reviewed: Allergies   Allergen Reactions    Pcn [Penicillins] Hives       Past Medical History- reviewed:  No past medical history on file.     Family History - reviewed:  Family History   Problem Relation Age of Onset    Elevated Lipids Mother     No Known Problems Father     No Known Problems Brother     No Known Problems Brother        Social History - reviewed:  Social History     Socioeconomic History    Marital status: SINGLE     Spouse name: Not on file    Number of children: Not on file    Years of education: Not on file    Highest education level: Not on file   Occupational History    Not on file   Tobacco Use    Smoking status: Former     Packs/day: 0.50     Years: 10.00     Pack years: 5.00     Types: Cigarettes     Passive exposure: Never    Smokeless tobacco: Never    Tobacco comments:     Stopped 12 years ago 2010   Vaping Use    Vaping Use: Former    Substances: THC, CBD    Devices: Refillable tank   Substance and Sexual Activity    Alcohol use: Yes     Comment: Occasional    Drug use: Not Currently    Sexual activity: Yes     Partners: Male     Birth control/protection: Condom   Other Topics Concern    Not on file   Social History Narrative    Not on file     Social Determinants of Health     Financial Resource Strain: Not on file   Food Insecurity: Not on file   Transportation Needs: Not on file   Physical Activity: Not on file   Stress: Not on file   Social Connections: Not on file   Intimate Partner Violence: Not on file   Housing Stability: Not on file       Depression screening:  3 most recent [address] 10/24/2022   Little interest or pleasure in doing things Not at all   Feeling down, depressed, irritable, or hopeless Not at all   Total Score PHQ 2 0   Trouble falling or staying asleep, or sleeping too much Not at all   Feeling tired or having little energy Not at all   Poor appetite, weight loss, or overeating Not at all   Feeling bad about yourself - or that you are a failure or have let yourself or your family down Not at all   Trouble concentrating on things such as school, work, reading, or watching TV Not at all   Moving or speaking so slowly that other people could have noticed; or the opposite being so fidgety that others notice Not at all   Thoughts of being better off dead, or hurting yourself in some way Not at all   PHQ 9 Score 0   How difficult have these problems made it for you to do your work, take care of your home and get along with others Not difficult at all         Review of systems:     A comprehensive review of systems was negative except for that written in the History of Present Illness. Visit Vitals  BP (!) 141/93 (BP 1 Location: Right arm, BP Patient Position: Sitting, BP Cuff Size: Adult)   Pulse 77   Temp 98.4 °F (36.9 °C) (Oral)   Resp 17   Ht 5' 10\" (1.778 m)   Wt 198 lb (89.8 kg)   SpO2 98%   BMI 28.41 kg/m²       General: Alert and oriented, in no acute distress. Well nourished. EYE: PERRL. Sclera and conjuctival clear. Extraocular movements intact. EARS: External normal, canals clear, tympanic membranes normal.   NOSE: Mucosa healthy without drainage or ulceration. OROPHARYNX: No suspicious lesions, normal dentition, pharynx, tongue and tonsils normal.  NECK: Supple; no masses; thyroid normal.  LUNGS: Respirations unlabored; clear to auscultation bilaterally. CARDIOVASCULAR: Regular, rate, and rhythm without murmurs, gallops or rubs. ABDOMEN: Soft; nontender; nondistended; normoactive bowel sounds; no masses or organomegaly. MUSCULOSKELETAL: FROM in all extremities     EXT: No edema. Neurovascularlly intact. Normal gait. SKIN: No rash. No suspicious lesions or moles. Neuro: Mental Status: Pt is alert and oriented to person, place, and time. Assessment/Plan       ICD-10-CM ICD-9-CM    1. Primary hypertension  I10 401.9       2. Mixed hyperlipidemia  E78.2 272.2 LIPID PANEL      LIPID PANEL      3. Bilateral pulmonary embolism (HCC)  I26.99 415.19       4. Adrenal mass (HCC)  E27.8 255.8         1. Primary hypertension  BP remains elevated. Pt would like to monitor without meds at this time. Will follow up for reevaluation. Continue lifestyle changes    2. Mixed hyperlipidemia  No longer taking statin. States that he would  rather manage through lifestyle. - LIPID PANEL; Future    3. Bilateral pulmonary embolism (HCC)  Unprovoked. On lifelong AC. Continue Eliquis 5mg BID. Follow up with Dr. Nasir Mario (heme/onc). 4. Adrenal mass ([facility] [address]  Scheduled for CT abd/pelv with and w/o cont. Follow with heme/onc.     Follow up: 8-12 weeks for BP management or as needed    I have discussed the diagnosis with the patient and the intended plan as seen in the above orders. The patient has received an after-visit summary and questions were answered concerning future plans. I have discussed medication side effects and warnings with the patient as well. Informed patient to return to the office if new symptoms arise.     Signed By: Jak Kelley MD     October 24, 2022

## 2022-10-30 DIAGNOSIS — E78.2 MIXED HYPERLIPIDEMIA: Primary | ICD-10-CM

## 2022-10-30 RX ORDER — ATORVASTATIN CALCIUM 40 MG/1
40 TABLET, FILM COATED ORAL DAILY
Qty: 90 TABLET | Refills: 3 | Status: SHIPPED | OUTPATIENT
Start: 2022-10-30 | End: 2022-11-01 | Stop reason: SDUPTHER

## 2022-10-31 ENCOUNTER — HOSPITAL ENCOUNTER (OUTPATIENT)
Dept: CT IMAGING | Age: 42
Discharge: HOME | End: 2022-10-31
Attending: INTERNAL MEDICINE
Payer: MEDICAID

## 2022-10-31 DIAGNOSIS — E27.8 ADRENAL MASS (HCC): ICD-10-CM

## 2022-10-31 PROCEDURE — 74170 CT ABD WO CNTRST FLWD CNTRST: CPT

## 2022-10-31 PROCEDURE — 74011000636 HC RX REV CODE- 636: Performed by: RADIOLOGY

## 2022-10-31 RX ADMIN — IOPAMIDOL 100 ML: 755 INJECTION, SOLUTION INTRAVENOUS at 17:24

## 2022-10-31 NOTE — PROGRESS NOTES
Reviewed    Marked HLD. Recommend getting back on statin. Please inform patient of very high Cholesterol for which I recommend getting on cholesterol medications  Sending Lipitor to his pharmacy.

## 2022-11-01 ENCOUNTER — TELEPHONE (OUTPATIENT)
Dept: FAMILY MEDICINE CLINIC | Age: 42
End: 2022-11-01

## 2022-11-01 ENCOUNTER — TELEPHONE (OUTPATIENT)
Dept: ONCOLOGY | Age: 42
End: 2022-11-01

## 2022-11-01 NOTE — TELEPHONE ENCOUNTER
[address] pt HIPAA verified x2. Made pt aware per Dr. Kendra Alvarez that his CT suggests that the adrenal spot is not cancer. Pt voiced understanding and has no questions or concerns at this time.

## 2022-11-01 NOTE — TELEPHONE ENCOUNTER
Patient Notified per Dr. Gloria Black:      ----- Message from Gloria Quezada MD sent at 10/30/2022  8:57 PM EDT -----  Reviewed    Marked HLD. Recommend getting back on statin. Please inform patient of very high Cholesterol for which I recommend getting on cholesterol medications  Sending Lipitor to his pharmacy.

## 2022-11-02 ENCOUNTER — DOCUMENTATION ONLY (OUTPATIENT)
Dept: FAMILY MEDICINE CLINIC | Age: 42
End: 2022-11-02

## 2022-11-02 NOTE — PROGRESS NOTES
PA was sent to plan for Atorvastatin Calcium 40 mg , per PA Department no PA is needed, members medicaid benefits on cover 30 tablets in 30 days.

## 2023-02-10 ENCOUNTER — OFFICE VISIT (OUTPATIENT)
Dept: FAMILY MEDICINE CLINIC | Age: 43
End: 2023-02-10
Payer: MEDICAID

## 2023-02-10 VITALS
WEIGHT: 200 LBS | OXYGEN SATURATION: 97 % | BODY MASS INDEX: 28.63 KG/M2 | RESPIRATION RATE: 17 BRPM | HEIGHT: 70 IN | SYSTOLIC BLOOD PRESSURE: 131 MMHG | DIASTOLIC BLOOD PRESSURE: 83 MMHG | HEART RATE: 73 BPM | TEMPERATURE: 98.3 F

## 2023-02-10 DIAGNOSIS — E78.2 MIXED HYPERLIPIDEMIA: ICD-10-CM

## 2023-02-10 DIAGNOSIS — R53.82 CHRONIC FATIGUE: ICD-10-CM

## 2023-02-10 DIAGNOSIS — K92.1 BLOOD IN THE STOOL: Primary | ICD-10-CM

## 2023-02-10 DIAGNOSIS — B07.8 OTHER VIRAL WARTS: ICD-10-CM

## 2023-02-10 NOTE — PROGRESS NOTES
[address] [address] STACIE Hanson. [place], [address] [place] [address]  [phone]    C/C: Blood in the stool and Chronic fatigue    HPI:    Unknown Tommy is a 38 y.o. /  male who presents to clinic today for evaluation of the issues listed above. Subjective;    Blood in stool:  Pt states that he has had on and off blood in his stool for more than a year. States that he has history of hemorrhoid but thought of brining this up. States that it started a long time before being started on Eliquis therefore denies any association or worsening as a result. Of note, pt presented to [facility] on 8/24/22 with SOB and later found to have LLE DVT on doppler and CTA showed pulmonary embolism bilaterally. Also found to have a left adrenal mass on CT abd/pelv without contrast (8/25/2022) . States that mass found to be benign. Follows with Dr. Miriam Marti. He has been started in lifelong Eliquis. 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 States that they would generally work late to either practice      Pt denies any  fever, chill, chest pain, abdominal pain, n/v/d, HA or dizziness. 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 On Pre-exposure ppx     Other Specialists/providers:  Heme/onc - Dr. Ct Mercado- reviewed: Allergies   Allergen Reactions    Pcn [Penicillins] Hives       Past Medical History- reviewed:  History reviewed. No pertinent past medical history.     Family History - reviewed:  Family History   Problem Relation Age of Onset    Elevated Lipids Mother     No Known Problems Father     No Known Problems Brother     No Known Problems Brother        Social History - reviewed:  Social History     Socioeconomic History    Marital status: SINGLE     Spouse name: Not on file    Number of children: Not on file Years of education: Not on file    Highest education level: Not on file   Occupational History    Not on file   Tobacco Use    Smoking status: Former     Packs/day: 0.50     Years: 10.00     Pack years: 5.00     Types: Cigarettes     Passive exposure: Never    Smokeless tobacco: Never    Tobacco comments:     Stopped 12 years ago 2010   Vaping Use    Vaping Use: Former    Substances: THC, CBD    Devices: Refillable tank   Substance and Sexual Activity    Alcohol use: Yes     Comment: Occasional    Drug use: Not Currently    Sexual activity: Yes     Partners: Male     Birth control/protection: Condom   Other Topics Concern    Not on file   Social History Narrative    Not on file     Social Determinants of Health     Financial Resource Strain: Not on file   Food Insecurity: Not on file   Transportation Needs: Not on file   Physical Activity: Not on file   Stress: Not on file   Social Connections: Not on file   Intimate Partner Violence: Not on file   Housing Stability: Not on file       Depression screening:  3 most recent [address] 10/24/2022   Little interest or pleasure in doing things Not at all   Feeling down, depressed, irritable, or hopeless Not at all   Total Score PHQ 2 0   Trouble falling or staying asleep, or sleeping too much Not at all   Feeling tired or having little energy Not at all   Poor appetite, weight loss, or overeating Not at all   Feeling bad about yourself - or that you are a failure or have let yourself or your family down Not at all   Trouble concentrating on things such as school, work, reading, or watching TV Not at all   Moving or speaking so slowly that other people could have noticed; or the opposite being so fidgety that others notice Not at all   Thoughts of being better off dead, or hurting yourself in some way Not at all   PHQ 9 Score 0   How difficult have these problems made it for you to do your work, take care of your home and get along with others Not difficult at all Review of systems:     A comprehensive review of systems was negative except for that written in the History of Present Illness. Visit Vitals  /83 (BP 1 Location: Right arm, BP Patient Position: Sitting, BP Cuff Size: Adult)   Pulse 73   Temp 98.3 °F (36.8 °C) (Oral)   Resp 17   Ht 5' 10\" (1.778 m)   Wt 200 lb (90.7 kg)   SpO2 97%   BMI 28.70 kg/m²       General: Alert and oriented, in no acute distress. Well nourished. EYE: PERRL. Sclera and conjuctival clear. Extraocular movements intact. EARS: External normal, canals clear, tympanic membranes normal.   NOSE: Mucosa healthy without drainage or ulceration. OROPHARYNX: No suspicious lesions, normal dentition, pharynx, tongue and tonsils normal.  NECK: Supple; no masses; thyroid normal.  LUNGS: Respirations unlabored; clear to auscultation bilaterally. CARDIOVASCULAR: Regular, rate, and rhythm without murmurs, gallops or rubs. ABDOMEN: Soft; nontender; nondistended; normoactive bowel sounds; no masses or organomegaly. MUSCULOSKELETAL: FROM in all extremities     EXT: No edema. Neurovascularlly intact. Normal gait. SKIN: No rash. No suspicious lesions or moles. Neuro: Mental Status: Pt is alert and oriented to person, place, and time. : Deferred per patient      Assessment/Plan       ICD-10-CM ICD-9-CM    1. Blood in the stool  K92.1 578.1 REFERRAL FOR COLONOSCOPY      CBC WITH AUTOMATED DIFF      CBC WITH AUTOMATED DIFF      2. Other viral warts  B07.8 078.19 REFERRAL TO DERMATOLOGY      3. Mixed hyperlipidemia  E78.2 272.2 LIPID PANEL      LIPID PANEL      4. Chronic fatigue  R53.82 780.79 CBC WITH AUTOMATED DIFF      METABOLIC PANEL, BASIC      TSH 3RD GENERATION      TSH 3RD GENERATION      METABOLIC PANEL, BASIC      CBC WITH AUTOMATED DIFF        1. Blood in the stool  Currently asymptomatic. Highly recommend seeing GI for further evaluation.  - REFERRAL FOR COLONOSCOPY  - CBC WITH AUTOMATED DIFF; Future    2.  Other viral warts  - REFERRAL TO DERMATOLOGY    3. Mixed hyperlipidemia  - LIPID PANEL; Future  - Continue Lipitor 40mg daily    4. Chronic fatigue  - CBC WITH AUTOMATED DIFF; Future  - METABOLIC PANEL, BASIC; Future  - TSH 3RD GENERATION; Future      Follow up: 3 months or sooner if needed    I have discussed the diagnosis with the patient and the intended plan as seen in the above orders. The patient has received an after-visit summary and questions were answered concerning future plans. I have discussed medication side effects and warnings with the patient as well. Informed patient to return to the office if new symptoms arise.     Signed By: Jimmie Polk MD     February 12, 2023

## 2023-02-10 NOTE — PROGRESS NOTES
Name and  Verified. Pharmacy verified     Chief Complaint   Patient presents with    Follow-up     8-12 weeks 2022 Hypertension       1. Have you been to the ER, urgent care clinic since your last visit? Hospitalized since your last visit? No    2. Have you seen or consulted any other health care providers outside of the [address] [place] [address] since your last visit? Include any pap smears or colon screening.  No        Health Maintenance Due   Topic Date Due    Hepatitis C Screening  Never done    Hepatitis B Vaccine (1 of 3 - Risk 3-dose series) Never done    DTaP/Tdap/Td series (1 - Tdap) Never done

## 2023-02-11 LAB
ANION GAP SERPL CALC-SCNC: 6 MMOL/L (ref 5–15)
BASOPHILS # BLD: 0 K/UL (ref 0–0.1)
BASOPHILS NFR BLD: 1 % (ref 0–1)
BUN SERPL-MCNC: 12 MG/DL (ref 6–20)
BUN/CREAT SERPL: 11 (ref 12–20)
CALCIUM SERPL-MCNC: 9.7 MG/DL (ref 8.5–10.1)
CHLORIDE SERPL-SCNC: 103 MMOL/L (ref 97–108)
CHOLEST SERPL-MCNC: 154 MG/DL
CO2 SERPL-SCNC: 30 MMOL/L (ref 21–32)
CREAT SERPL-MCNC: 1.13 MG/DL (ref 0.7–1.3)
DIFFERENTIAL METHOD BLD: ABNORMAL
EOSINOPHIL # BLD: 0 K/UL (ref 0–0.4)
EOSINOPHIL NFR BLD: 0 % (ref 0–7)
ERYTHROCYTE [DISTWIDTH] IN BLOOD BY AUTOMATED COUNT: 12.5 % (ref 11.5–14.5)
GLUCOSE SERPL-MCNC: 89 MG/DL (ref 65–100)
HCT VFR BLD AUTO: 55.3 % (ref 36.6–50.3)
HDLC SERPL-MCNC: 36 MG/DL
HDLC SERPL: 4.3 (ref 0–5)
HGB BLD-MCNC: 17.8 G/DL (ref 12.1–17)
IMM GRANULOCYTES # BLD AUTO: 0 K/UL (ref 0–0.04)
IMM GRANULOCYTES NFR BLD AUTO: 0 % (ref 0–0.5)
LDLC SERPL CALC-MCNC: 93.2 MG/DL (ref 0–100)
LYMPHOCYTES # BLD: 1.1 K/UL (ref 0.8–3.5)
LYMPHOCYTES NFR BLD: 20 % (ref 12–49)
MCH RBC QN AUTO: 32.1 PG (ref 26–34)
MCHC RBC AUTO-ENTMCNC: 32.2 G/DL (ref 30–36.5)
MCV RBC AUTO: 99.8 FL (ref 80–99)
MONOCYTES # BLD: 0.5 K/UL (ref 0–1)
MONOCYTES NFR BLD: 10 % (ref 5–13)
NEUTS SEG # BLD: 3.7 K/UL (ref 1.8–8)
NEUTS SEG NFR BLD: 69 % (ref 32–75)
NRBC # BLD: 0 K/UL (ref 0–0.01)
NRBC BLD-RTO: 0 PER 100 WBC
PLATELET # BLD AUTO: 261 K/UL (ref 150–400)
PMV BLD AUTO: 10.4 FL (ref 8.9–12.9)
POTASSIUM SERPL-SCNC: 4.4 MMOL/L (ref 3.5–5.1)
RBC # BLD AUTO: 5.54 M/UL (ref 4.1–5.7)
SODIUM SERPL-SCNC: 139 MMOL/L (ref 136–145)
TRIGL SERPL-MCNC: 124 MG/DL (ref ?–150)
TSH SERPL DL<=0.05 MIU/L-ACNC: 0.36 UIU/ML (ref 0.36–3.74)
VLDLC SERPL CALC-MCNC: 24.8 MG/DL
WBC # BLD AUTO: 5.4 K/UL (ref 4.1–11.1)

## 2023-04-24 DIAGNOSIS — I26.99 BILATERAL PULMONARY EMBOLISM (HCC): Primary | ICD-10-CM

## 2023-04-24 NOTE — TELEPHONE ENCOUNTER
Oncology Pharmacist Note    Wolf Carreno is a  38 y.o.male  diagnosed with Pulmonary embolism/DVT . Mr. Juany Ceja is being treated with apixaban.      Last OV 10/14/22  Next OV 5/25/23    Refill for apixaban sent to pharmacy on file       Eduin Hilario, PHARMD, BCOP, [address] Only    Program: Medical Group  CPA in place: Yes  Recommendation Provided To: Patient/Caregiver: 1 via [facility] Detail: Refill(s) Provided  Intervention Accepted By: Patient/Caregiver: 1    Time Spent (min): 10

## 2023-05-25 ENCOUNTER — OFFICE VISIT (OUTPATIENT)
Age: 43
End: 2023-05-25

## 2023-05-25 VITALS
HEART RATE: 85 BPM | TEMPERATURE: 98.6 F | RESPIRATION RATE: 18 BRPM | SYSTOLIC BLOOD PRESSURE: 127 MMHG | DIASTOLIC BLOOD PRESSURE: 76 MMHG | OXYGEN SATURATION: 97 % | BODY MASS INDEX: 29.41 KG/M2 | WEIGHT: 205 LBS

## 2023-05-25 DIAGNOSIS — I26.99 BILATERAL PULMONARY EMBOLISM (HCC): Primary | ICD-10-CM

## 2023-05-25 PROCEDURE — 99213 OFFICE O/P EST LOW 20 MIN: CPT | Performed by: INTERNAL MEDICINE

## 2023-05-25 NOTE — PROGRESS NOTES
Deondre Grande is a 38 y.o. male    Chief Complaint   Patient presents with    Follow-up     Pulmonary embolism. 1. Have you been to the ER, urgent care clinic since your last visit? Hospitalized since your last visit? No    2. Have you seen or consulted any other health care providers outside of the [address] [place] [address] since your last visit? Include any pap smears or colon screening.  Yes, GI (Matthew)

## 2023-05-25 NOTE — PROGRESS NOTES
[facility] at [address], [address], [place]: [phone]  F: [phone]          Reason for Visit:     Twanda Snellen is a 38 y.o. male who is seen for follow up of Pulmonary embolism. History of Present Illness:     Is a 39-year-old male with no significant past medical history seen for above. He presented to the emergency room on 8/25/2022 with complaints of 4 days of shortness of breath getting  progressively worse with exertion without any fevers cough chills. Also had left cough pain that started about the same time. Dopplers were obtained and showed thrombosis in the left distal femoral and proximal popliteal veins. CTA showed extensive  pulmonary embolism bilaterally. There was a left adrenal mass noted on the CTA. The abdomen was obtained which showed calcifications in the left adrenal gland and thought to be a complex cyst versus cystic mass. CT suggestive of  He was started on apixaban. Scans were unremarkable. He is on Truvada. He has a colonoscopy scheduled this year. No bleeding. No new leg swelling, no SOB. 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           Physical Exam:     Visit Vitals        BP  (!) 163/106     Pulse  85     Resp  18     Ht  5' 10\" (1.778 m)     Wt  202 lb (91.6 kg)     SpO2  94%        BMI        ECOG PS: 0   General: No distress   Eyes:  PERRLA, anicteric sclerae   HENT: Atraumatic   Psych: Alert, oriented, appropriate affect, normal judgment/insight          Results:     Lab Results   Component Value Date/Time    WBC 5.4 02/10/2023 01:17 PM    HGB 17.8 02/10/2023 01:17 PM    HCT 55.3 02/10/2023 01:17 PM     02/10/2023 01:17 PM    MCV 99.8 02/10/2023 01:17 PM     Lab Results   Component Value Date/Time     02/10/2023 01:17 PM    K 4.4 02/10/2023

## 2023-08-10 ENCOUNTER — OFFICE VISIT (OUTPATIENT)
Age: 43
End: 2023-08-10
Payer: MEDICAID

## 2023-08-10 VITALS
BODY MASS INDEX: 29.35 KG/M2 | HEART RATE: 69 BPM | WEIGHT: 205 LBS | TEMPERATURE: 98.3 F | RESPIRATION RATE: 17 BRPM | OXYGEN SATURATION: 99 % | DIASTOLIC BLOOD PRESSURE: 85 MMHG | SYSTOLIC BLOOD PRESSURE: 126 MMHG | HEIGHT: 70 IN

## 2023-08-10 DIAGNOSIS — E78.2 MIXED HYPERLIPIDEMIA: Primary | ICD-10-CM

## 2023-08-10 PROCEDURE — 99213 OFFICE O/P EST LOW 20 MIN: CPT | Performed by: STUDENT IN AN ORGANIZED HEALTH CARE EDUCATION/TRAINING PROGRAM

## 2023-08-10 RX ORDER — SIMVASTATIN 20 MG
20 TABLET ORAL NIGHTLY
Qty: 90 TABLET | Refills: 1 | Status: SHIPPED | OUTPATIENT
Start: 2023-08-10

## 2023-08-10 SDOH — ECONOMIC STABILITY: HOUSING INSECURITY
IN THE LAST 12 MONTHS, WAS THERE A TIME WHEN YOU DID NOT HAVE A STEADY PLACE TO SLEEP OR SLEPT IN A SHELTER (INCLUDING NOW)?: NO

## 2023-08-10 SDOH — ECONOMIC STABILITY: INCOME INSECURITY: HOW HARD IS IT FOR YOU TO PAY FOR THE VERY BASICS LIKE FOOD, HOUSING, MEDICAL CARE, AND HEATING?: NOT HARD AT ALL

## 2023-08-10 SDOH — ECONOMIC STABILITY: FOOD INSECURITY: WITHIN THE PAST 12 MONTHS, THE FOOD YOU BOUGHT JUST DIDN'T LAST AND YOU DIDN'T HAVE MONEY TO GET MORE.: NEVER TRUE

## 2023-08-10 SDOH — ECONOMIC STABILITY: FOOD INSECURITY: WITHIN THE PAST 12 MONTHS, YOU WORRIED THAT YOUR FOOD WOULD RUN OUT BEFORE YOU GOT MONEY TO BUY MORE.: NEVER TRUE

## 2023-08-10 ASSESSMENT — PATIENT HEALTH QUESTIONNAIRE - PHQ9
1. LITTLE INTEREST OR PLEASURE IN DOING THINGS: 0
SUM OF ALL RESPONSES TO PHQ QUESTIONS 1-9: 0
SUM OF ALL RESPONSES TO PHQ9 QUESTIONS 1 & 2: 0
2. FEELING DOWN, DEPRESSED OR HOPELESS: 0
SUM OF ALL RESPONSES TO PHQ QUESTIONS 1-9: 0

## 2023-08-10 NOTE — PROGRESS NOTES
Name and Date of Birth Verified    Pharmacy Verified    Chief Complaint   Patient presents with    Follow-up     2/10/2023 Melena     Fasting for labs, [facility]. 1. \"Have you been to the ER, urgent care clinic since your last visit? Hospitalized since your last visit? \"     Yes  8/3/2023  [address] [facility]  High risk sexual behavior  Screening for STD (sexually transmitted disease    2. \"Have you seen or consulted any other health care providers outside of the [address] [place] [address] Avenue since your last visit? \" No     3. For patients aged 43-73: Has the patient had a colonoscopy / FIT/ Cologuard? N/A      If the patient is female:    4. For patients aged 43-66: Has the patient had a mammogram within the past 2 years? N/A      5. For patients aged 21-65: Has the patient had a pap smear?  N/A     Health Maintenance Due   Topic Date Due    COVID-19 Vaccine (1) 03/05/1981    HIV screen  Never done    Hepatitis C screen  Never done    DTaP/Tdap/Td vaccine (1 - Tdap) Never done    Flu vaccine (1) Never done

## 2023-10-30 ENCOUNTER — CLINICAL DOCUMENTATION (OUTPATIENT)
Age: 43
End: 2023-10-30

## 2023-10-30 RX ORDER — APIXABAN 2.5 MG/1
2.5 TABLET, FILM COATED ORAL 2 TIMES DAILY
Qty: 180 TABLET | Refills: 1 | Status: SHIPPED | OUTPATIENT
Start: 2023-10-30

## 2024-01-29 DIAGNOSIS — E78.2 MIXED HYPERLIPIDEMIA: ICD-10-CM

## 2024-01-30 NOTE — TELEPHONE ENCOUNTER
Last appointment: 12/18/23  Next appointment: 6/24/24  Previous refill encounter(s): 11/1/22    Requested Prescriptions     Pending Prescriptions Disp Refills    atorvastatin (LIPITOR) 40 MG tablet [Pharmacy Med Name: ATORVASTATIN 40 MG TABLET] 90 tablet 3     Sig: TAKE 1 TABLET BY MOUTH EVERY DAY         For Pharmacy Admin Tracking Only    Program: Medication Refill  CPA in place:    Recommendation Provided To:   Intervention Detail: New Rx: 1, reason: Patient Preference  Intervention Accepted By:   Gap Closed?:    Time Spent (min): 5

## 2024-01-31 RX ORDER — ATORVASTATIN CALCIUM 40 MG/1
40 TABLET, FILM COATED ORAL DAILY
Qty: 90 TABLET | Refills: 3 | Status: SHIPPED | OUTPATIENT
Start: 2024-01-31

## 2024-05-29 DIAGNOSIS — I27.82 CHRONIC PULMONARY EMBOLISM WITHOUT ACUTE COR PULMONALE, UNSPECIFIED PULMONARY EMBOLISM TYPE (HCC): ICD-10-CM

## 2024-05-30 NOTE — TELEPHONE ENCOUNTER
Last appointment: 12/18/23  Next appointment: 6/24/24  Previous refill encounter(s): 12/18/23 #180 with 1 refill    Requested Prescriptions     Pending Prescriptions Disp Refills    apixaban (ELIQUIS) 2.5 MG TABS tablet 180 tablet 1     Sig: Take 1 tablet by mouth 2 times daily         For Pharmacy Admin Tracking Only    Program: Medication Refill  CPA in place:    Recommendation Provided To:   Intervention Detail: New Rx: 1, reason: Patient Preference  Intervention Accepted By:   Gap Closed?:    Time Spent (min): 5

## 2024-06-24 ENCOUNTER — OFFICE VISIT (OUTPATIENT)
Age: 44
End: 2024-06-24
Payer: MEDICAID

## 2024-06-24 VITALS
SYSTOLIC BLOOD PRESSURE: 131 MMHG | WEIGHT: 210.98 LBS | BODY MASS INDEX: 30.2 KG/M2 | HEART RATE: 80 BPM | TEMPERATURE: 98.6 F | HEIGHT: 70 IN | DIASTOLIC BLOOD PRESSURE: 89 MMHG | RESPIRATION RATE: 18 BRPM | OXYGEN SATURATION: 99 %

## 2024-06-24 DIAGNOSIS — E78.2 MIXED HYPERLIPIDEMIA: ICD-10-CM

## 2024-06-24 DIAGNOSIS — E78.2 MIXED HYPERLIPIDEMIA: Primary | ICD-10-CM

## 2024-06-24 LAB
CHOLEST SERPL-MCNC: 161 MG/DL
HDLC SERPL-MCNC: 43 MG/DL
HDLC SERPL: 3.7 (ref 0–5)
LDLC SERPL CALC-MCNC: 88.6 MG/DL (ref 0–100)
TRIGL SERPL-MCNC: 147 MG/DL
VLDLC SERPL CALC-MCNC: 29.4 MG/DL

## 2024-06-24 PROCEDURE — 99213 OFFICE O/P EST LOW 20 MIN: CPT | Performed by: STUDENT IN AN ORGANIZED HEALTH CARE EDUCATION/TRAINING PROGRAM

## 2024-06-24 RX ORDER — EMTRICITABINE AND TENOFOVIR ALAFENAMIDE 200; 25 MG/1; MG/1
1 TABLET ORAL DAILY
COMMUNITY
Start: 2024-03-04

## 2024-06-24 SDOH — ECONOMIC STABILITY: FOOD INSECURITY: WITHIN THE PAST 12 MONTHS, THE FOOD YOU BOUGHT JUST DIDN'T LAST AND YOU DIDN'T HAVE MONEY TO GET MORE.: NEVER TRUE

## 2024-06-24 SDOH — ECONOMIC STABILITY: FOOD INSECURITY: WITHIN THE PAST 12 MONTHS, YOU WORRIED THAT YOUR FOOD WOULD RUN OUT BEFORE YOU GOT MONEY TO BUY MORE.: NEVER TRUE

## 2024-06-24 SDOH — ECONOMIC STABILITY: INCOME INSECURITY: HOW HARD IS IT FOR YOU TO PAY FOR THE VERY BASICS LIKE FOOD, HOUSING, MEDICAL CARE, AND HEATING?: NOT HARD AT ALL

## 2024-06-24 ASSESSMENT — ANXIETY QUESTIONNAIRES
6. BECOMING EASILY ANNOYED OR IRRITABLE: NOT AT ALL
1. FEELING NERVOUS, ANXIOUS, OR ON EDGE: NOT AT ALL
IF YOU CHECKED OFF ANY PROBLEMS ON THIS QUESTIONNAIRE, HOW DIFFICULT HAVE THESE PROBLEMS MADE IT FOR YOU TO DO YOUR WORK, TAKE CARE OF THINGS AT HOME, OR GET ALONG WITH OTHER PEOPLE: NOT DIFFICULT AT ALL
5. BEING SO RESTLESS THAT IT IS HARD TO SIT STILL: NOT AT ALL
2. NOT BEING ABLE TO STOP OR CONTROL WORRYING: NOT AT ALL
3. WORRYING TOO MUCH ABOUT DIFFERENT THINGS: NOT AT ALL
GAD7 TOTAL SCORE: 0
4. TROUBLE RELAXING: NOT AT ALL
GAD7 TOTAL SCORE: 0
7. FEELING AFRAID AS IF SOMETHING AWFUL MIGHT HAPPEN: NOT AT ALL

## 2024-06-24 ASSESSMENT — PATIENT HEALTH QUESTIONNAIRE - PHQ9
SUM OF ALL RESPONSES TO PHQ QUESTIONS 1-9: 0
1. LITTLE INTEREST OR PLEASURE IN DOING THINGS: NOT AT ALL
SUM OF ALL RESPONSES TO PHQ QUESTIONS 1-9: 0
2. FEELING DOWN, DEPRESSED OR HOPELESS: NOT AT ALL
SUM OF ALL RESPONSES TO PHQ9 QUESTIONS 1 & 2: 0
SUM OF ALL RESPONSES TO PHQ QUESTIONS 1-9: 0
SUM OF ALL RESPONSES TO PHQ QUESTIONS 1-9: 0

## 2024-06-24 NOTE — PROGRESS NOTES
Chief Complaint   Patient presents with    3 Month Follow-Up     Here for routine 3 month follow up.            1. Have you been to the ER, urgent care clinic since your last visit?  Hospitalized since your last visit?No    2. Have you seen or consulted any other health care providers outside of the [facility] since your last visit?  Include any pap smears or colon screening. No

## 2024-06-24 NOTE — PROGRESS NOTES
[facility]  [address]  [address] [place] [id number] [phone]    Subjective  Tigre Medina is a 43 y.o.  /  male , established patient, here for evaluation of the concern(s) above;    HLD   Pt is doing well on current meds with no medication side effects noted   No new myalgias, no joint pains, no weakness   No TIA's, no chest pain on exertion, no dyspnea on exertion, no swelling of ankles.   Exercising -walking, staying active   Dieting - Yes  Family hx of high cholesterol: yes.    DVT/Pe:  Pt presented to the emergency room on 8/25/2022 with complaints of 4 days of shortness of breath getting  progressively worse with exertion without any fevers cough chills. Dopplers showed thrombosis in the left distal femoral and proximal popliteal veins.  CTA showed extensive  pulmonary embolism bilaterally.  There was a left adrenal mass noted on the CTA.  The abdomen was obtained which showed calcifications in the left adrenal gland and thought to be a complex cyst versus cystic mass. He has since been on Eliquis.  Followed with Heme/onc (Dr. Graves) and states that he will be on Eliquis indefinitely as long as benefit outweighs risk. Recommended that he follow up with pcp for eliquis refills.       Denies any leg swelling, chest pain or sob at this time.     -He is on Descovy managed by a different clinic.    -Did colonoscopy (03/2024) - repeat in 10 years.       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  On Pre-exposure ppx      Other Specialists/providers:  Heme/onc - Dr. Graves, unprovoked DVT and bilateral pe.  Sees a NP for prescription of Descovy.       Allergies - reviewed:   Allergies   Allergen Reactions    Penicillins Hives       Past Medical History - reviewed:  History reviewed. No pertinent past medical history.    Depression

## 2024-06-26 ENCOUNTER — CLINICAL DOCUMENTATION (OUTPATIENT)
Age: 44
End: 2024-06-26

## 2024-11-25 DIAGNOSIS — I27.82 CHRONIC PULMONARY EMBOLISM WITHOUT ACUTE COR PULMONALE, UNSPECIFIED PULMONARY EMBOLISM TYPE (HCC): ICD-10-CM

## 2024-11-26 RX ORDER — APIXABAN 2.5 MG/1
2.5 TABLET, FILM COATED ORAL 2 TIMES DAILY
Qty: 180 TABLET | Refills: 1 | Status: SHIPPED | OUTPATIENT
Start: 2024-11-26 | End: 2024-11-27 | Stop reason: SDUPTHER

## 2024-11-26 NOTE — TELEPHONE ENCOUNTER
Last appointment: 6/24/24  Next appointment: 1/6/25  Previous refill encounter(s): 6/10/24 #180 with 1 refill    Requested Prescriptions     Pending Prescriptions Disp Refills    ELIQUIS 2.5 MG TABS tablet [Pharmacy Med Name: ELIQUIS 2.5 MG TABLET] 180 tablet 1     Sig: TAKE 1 TABLET BY MOUTH TWICE A DAY         For Pharmacy Admin Tracking Only    Program: Medication Refill  CPA in place:    Recommendation Provided To:   Intervention Detail: New Rx: 1, reason: Patient Preference  Intervention Accepted By:   Gap Closed?:    Time Spent (min): 5

## 2024-11-27 DIAGNOSIS — I27.82 CHRONIC PULMONARY EMBOLISM WITHOUT ACUTE COR PULMONALE, UNSPECIFIED PULMONARY EMBOLISM TYPE (HCC): ICD-10-CM

## 2025-02-18 ENCOUNTER — TELEPHONE (OUTPATIENT)
Age: 45
End: 2025-02-18

## 2025-02-18 NOTE — TELEPHONE ENCOUNTER
Patient stated that it was a struggle for him to get through on the virtual visit but he patiently waited. Finally was able to check in but was past appointment time.    Rescheduled with Dr. Mei for medication refills.

## 2025-02-19 ENCOUNTER — TELEMEDICINE (OUTPATIENT)
Age: 45
End: 2025-02-19
Payer: MEDICAID

## 2025-02-19 DIAGNOSIS — E78.2 MIXED HYPERLIPIDEMIA: ICD-10-CM

## 2025-02-19 DIAGNOSIS — I27.82 CHRONIC PULMONARY EMBOLISM WITHOUT ACUTE COR PULMONALE, UNSPECIFIED PULMONARY EMBOLISM TYPE (HCC): ICD-10-CM

## 2025-02-19 PROCEDURE — 99213 OFFICE O/P EST LOW 20 MIN: CPT | Performed by: FAMILY MEDICINE

## 2025-02-19 RX ORDER — PROPRANOLOL HYDROCHLORIDE 10 MG/1
10 TABLET ORAL DAILY PRN
Qty: 30 TABLET | Refills: 0 | Status: SHIPPED | OUTPATIENT
Start: 2025-02-19

## 2025-02-19 RX ORDER — ATORVASTATIN CALCIUM 40 MG/1
40 TABLET, FILM COATED ORAL DAILY
Qty: 90 TABLET | Refills: 3 | Status: SHIPPED | OUTPATIENT
Start: 2025-02-19

## 2025-02-19 SDOH — ECONOMIC STABILITY: FOOD INSECURITY: WITHIN THE PAST 12 MONTHS, YOU WORRIED THAT YOUR FOOD WOULD RUN OUT BEFORE YOU GOT MONEY TO BUY MORE.: NEVER TRUE

## 2025-02-19 SDOH — ECONOMIC STABILITY: FOOD INSECURITY: WITHIN THE PAST 12 MONTHS, THE FOOD YOU BOUGHT JUST DIDN'T LAST AND YOU DIDN'T HAVE MONEY TO GET MORE.: NEVER TRUE

## 2025-02-19 ASSESSMENT — PATIENT HEALTH QUESTIONNAIRE - PHQ9
SUM OF ALL RESPONSES TO PHQ QUESTIONS 1-9: 0
2. FEELING DOWN, DEPRESSED OR HOPELESS: NOT AT ALL
1. LITTLE INTEREST OR PLEASURE IN DOING THINGS: NOT AT ALL
SUM OF ALL RESPONSES TO PHQ QUESTIONS 1-9: 0
SUM OF ALL RESPONSES TO PHQ QUESTIONS 1-9: 0
SUM OF ALL RESPONSES TO PHQ9 QUESTIONS 1 & 2: 0
SUM OF ALL RESPONSES TO PHQ QUESTIONS 1-9: 0

## 2025-02-19 NOTE — PROGRESS NOTES
Tigre Medina, was evaluated through a synchronous (real-time) audio-video encounter. The patient (or guardian if applicable) is aware that this is a billable service, which includes applicable co-pays. This Virtual Visit was conducted with patient's (and/or legal guardian's) consent. Patient identification was verified, and a caregiver was present when appropriate.   The patient was located at Home: [address] [address]  Provider was located at Facility (Appt Dept): [address] [place] [phone]  Confirm you are appropriately licensed, registered, or certified to deliver care in the state where the patient is located as indicated above. If you are not or unsure, please re-schedule the visit: Yes, I confirm.     Tigre Medina (:  1980) is a Established patient, presenting virtually for evaluation of the following:    In addition patient has history of ypercholesterolemia has been compliant with the statin therapy denies any muscle ache, currently taking the medication nightly last lipid panel was reviewed and was normal ++++refills needed at this time has been trying to have a low-fat low-cholesterol diet sometimes likes the fast foods weight has been stable  Patient also with anxiety state while on the stage requesting therapy for it denies suicidal homicidal ideation has had no bleeding noted easy bruisability  Constitutional: no fever, nl energy levels, nl sleep patterns, nl appetite, and nl weight fluctuations,  - exercise habits,  nad     HENT: no ear pain or nosebleeds. No blurred vision  Respiratory: no shortness of breath, wheezing cough   Cardiovascular: Has no chest pain, ,and racing heart .   Gastrointestinal: No constipation, diarrhea, nausea and vomiting.   Genitourinary: No frequency.   Musculoskeletal: Negative for joint pain.   Skin: no itching, no rash.   Neurological: Negative for dizziness, no tremors  Psychiatric/Behavioral: no for

## 2025-02-19 NOTE — PROGRESS NOTES
Chief Complaint   Patient presents with    Medication Refill       \"Have you been to the ER, urgent care clinic since your last visit?  Hospitalized since your last visit?\"    NO    “Have you seen or consulted any other health care providers outside of [facility] since your last visit?”    NO        “Have you had a colorectal cancer screening such as a colonoscopy/FIT/Cologuard?    NO    No colonoscopy on file  No cologuard on file  No FIT/FOBT on file   No flexible sigmoidoscopy on file       The patient, Tigre Medina, identity was verified by name and .

## 2025-03-14 RX ORDER — PROPRANOLOL HYDROCHLORIDE 10 MG/1
10 TABLET ORAL DAILY PRN
Qty: 30 TABLET | Refills: 0 | Status: SHIPPED | OUTPATIENT
Start: 2025-03-14

## 2025-03-14 NOTE — TELEPHONE ENCOUNTER
Last appointment: 2/19/25  Next appointment: none  Previous refill encounter(s): 2/19/25 #30    Requested Prescriptions     Pending Prescriptions Disp Refills    propranolol (INDERAL) 10 MG tablet [Pharmacy Med Name: PROPRANOLOL 10 MG TABLET] 30 tablet 0     Sig: TAKE 1 TABLET BY MOUTH DAILY AS NEEDED FOR ANXIETY         For Pharmacy Admin Tracking Only    Program: Medication Refill  CPA in place:    Recommendation Provided To:   Intervention Detail: New Rx: 1, reason: Patient Preference  Intervention Accepted By:   Gap Closed?:    Time Spent (min): 5

## 2025-04-08 RX ORDER — PROPRANOLOL HYDROCHLORIDE 10 MG/1
10 TABLET ORAL DAILY PRN
Qty: 30 TABLET | Refills: 0 | Status: SHIPPED | OUTPATIENT
Start: 2025-04-08

## 2025-05-08 RX ORDER — PROPRANOLOL HYDROCHLORIDE 10 MG/1
10 TABLET ORAL DAILY PRN
Qty: 90 TABLET | Refills: 1 | Status: SHIPPED | OUTPATIENT
Start: 2025-05-08

## 2025-05-08 NOTE — TELEPHONE ENCOUNTER
Last appointment: 2/19/25  Next appointment: none  Previous refill encounter(s): 4/8/25 #30    Requested Prescriptions     Pending Prescriptions Disp Refills    propranolol (INDERAL) 10 MG tablet [Pharmacy Med Name: PROPRANOLOL 10 MG TABLET] 90 tablet 1     Sig: TAKE 1 TABLET BY MOUTH EVERY DAY AS NEEDED FOR ANXIETY         For Pharmacy Admin Tracking Only    Program: Medication Refill  CPA in place:    Recommendation Provided To:   Intervention Detail: New Rx: 1, reason: Patient Preference  Intervention Accepted By:   Gap Closed?:    Time Spent (min): 5